# Patient Record
Sex: FEMALE | Race: WHITE | NOT HISPANIC OR LATINO | Employment: FULL TIME | URBAN - METROPOLITAN AREA
[De-identification: names, ages, dates, MRNs, and addresses within clinical notes are randomized per-mention and may not be internally consistent; named-entity substitution may affect disease eponyms.]

---

## 2017-10-24 ENCOUNTER — HOSPITAL ENCOUNTER (OUTPATIENT)
Facility: HOSPITAL | Age: 37
Setting detail: OBSERVATION
Discharge: HOME | End: 2017-10-25
Attending: EMERGENCY MEDICINE | Admitting: INTERNAL MEDICINE
Payer: COMMERCIAL

## 2017-10-24 ENCOUNTER — GENERIC CONVERSION - ENCOUNTER (OUTPATIENT)
Dept: OTHER | Facility: OTHER | Age: 37
End: 2017-10-24

## 2017-10-24 ENCOUNTER — APPOINTMENT (EMERGENCY)
Dept: CT IMAGING | Facility: HOSPITAL | Age: 37
End: 2017-10-24
Payer: COMMERCIAL

## 2017-10-24 ENCOUNTER — APPOINTMENT (EMERGENCY)
Dept: RADIOLOGY | Facility: HOSPITAL | Age: 37
End: 2017-10-24
Payer: COMMERCIAL

## 2017-10-24 DIAGNOSIS — R07.9 CHEST PAIN: ICD-10-CM

## 2017-10-24 DIAGNOSIS — I16.0 HYPERTENSIVE URGENCY: Primary | ICD-10-CM

## 2017-10-24 DIAGNOSIS — I10 HYPERTENSION: ICD-10-CM

## 2017-10-24 LAB
ALBUMIN SERPL BCP-MCNC: 3.5 G/DL (ref 3.5–5)
ALP SERPL-CCNC: 78 U/L (ref 46–116)
ALT SERPL W P-5'-P-CCNC: 25 U/L (ref 12–78)
ANION GAP SERPL CALCULATED.3IONS-SCNC: 10 MMOL/L (ref 4–13)
AST SERPL W P-5'-P-CCNC: 20 U/L (ref 5–45)
BASOPHILS # BLD AUTO: 0.03 THOUSANDS/ΜL (ref 0–0.1)
BASOPHILS NFR BLD AUTO: 0 % (ref 0–1)
BILIRUB SERPL-MCNC: 0.3 MG/DL (ref 0.2–1)
BUN SERPL-MCNC: 10 MG/DL (ref 5–25)
CALCIUM SERPL-MCNC: 8.5 MG/DL (ref 8.3–10.1)
CHLORIDE SERPL-SCNC: 101 MMOL/L (ref 100–108)
CO2 SERPL-SCNC: 30 MMOL/L (ref 21–32)
CREAT SERPL-MCNC: 0.98 MG/DL (ref 0.6–1.3)
EOSINOPHIL # BLD AUTO: 0.26 THOUSAND/ΜL (ref 0–0.61)
EOSINOPHIL NFR BLD AUTO: 3 % (ref 0–6)
ERYTHROCYTE [DISTWIDTH] IN BLOOD BY AUTOMATED COUNT: 13.1 % (ref 11.6–15.1)
EXT PREG TEST URINE: NEGATIVE
GFR SERPL CREATININE-BSD FRML MDRD: 73 ML/MIN/1.73SQ M
GLUCOSE SERPL-MCNC: 111 MG/DL (ref 65–140)
HCT VFR BLD AUTO: 43.1 % (ref 34.8–46.1)
HGB BLD-MCNC: 14.5 G/DL (ref 11.5–15.4)
LYMPHOCYTES # BLD AUTO: 2.25 THOUSANDS/ΜL (ref 0.6–4.47)
LYMPHOCYTES NFR BLD AUTO: 22 % (ref 14–44)
MCH RBC QN AUTO: 29.7 PG (ref 26.8–34.3)
MCHC RBC AUTO-ENTMCNC: 33.6 G/DL (ref 31.4–37.4)
MCV RBC AUTO: 88 FL (ref 82–98)
MONOCYTES # BLD AUTO: 0.76 THOUSAND/ΜL (ref 0.17–1.22)
MONOCYTES NFR BLD AUTO: 8 % (ref 4–12)
NEUTROPHILS # BLD AUTO: 6.74 THOUSANDS/ΜL (ref 1.85–7.62)
NEUTS SEG NFR BLD AUTO: 67 % (ref 43–75)
PLATELET # BLD AUTO: 289 THOUSANDS/UL (ref 149–390)
PMV BLD AUTO: 11.4 FL (ref 8.9–12.7)
POTASSIUM SERPL-SCNC: 2.7 MMOL/L (ref 3.5–5.3)
PROT SERPL-MCNC: 7.5 G/DL (ref 6.4–8.2)
RBC # BLD AUTO: 4.89 MILLION/UL (ref 3.81–5.12)
SODIUM SERPL-SCNC: 141 MMOL/L (ref 136–145)
TROPONIN I SERPL-MCNC: <0.02 NG/ML
WBC # BLD AUTO: 10.04 THOUSAND/UL (ref 4.31–10.16)

## 2017-10-24 PROCEDURE — 93005 ELECTROCARDIOGRAM TRACING: CPT | Performed by: EMERGENCY MEDICINE

## 2017-10-24 PROCEDURE — 80053 COMPREHEN METABOLIC PANEL: CPT | Performed by: EMERGENCY MEDICINE

## 2017-10-24 PROCEDURE — 96365 THER/PROPH/DIAG IV INF INIT: CPT

## 2017-10-24 PROCEDURE — 70450 CT HEAD/BRAIN W/O DYE: CPT

## 2017-10-24 PROCEDURE — 36415 COLL VENOUS BLD VENIPUNCTURE: CPT | Performed by: EMERGENCY MEDICINE

## 2017-10-24 PROCEDURE — 84484 ASSAY OF TROPONIN QUANT: CPT | Performed by: EMERGENCY MEDICINE

## 2017-10-24 PROCEDURE — 71020 HB CHEST X-RAY 2VW FRONTAL&LATL: CPT

## 2017-10-24 PROCEDURE — 81025 URINE PREGNANCY TEST: CPT | Performed by: EMERGENCY MEDICINE

## 2017-10-24 PROCEDURE — 85025 COMPLETE CBC W/AUTO DIFF WBC: CPT | Performed by: EMERGENCY MEDICINE

## 2017-10-24 PROCEDURE — 96375 TX/PRO/DX INJ NEW DRUG ADDON: CPT

## 2017-10-24 RX ORDER — LABETALOL HYDROCHLORIDE 5 MG/ML
10 INJECTION, SOLUTION INTRAVENOUS ONCE
Status: COMPLETED | OUTPATIENT
Start: 2017-10-24 | End: 2017-10-24

## 2017-10-24 RX ORDER — LEVOTHYROXINE SODIUM 112 UG/1
112 TABLET ORAL DAILY
COMMUNITY
End: 2018-10-17 | Stop reason: ALTCHOICE

## 2017-10-24 RX ORDER — ACETAMINOPHEN 325 MG/1
650 TABLET ORAL ONCE
Status: COMPLETED | OUTPATIENT
Start: 2017-10-24 | End: 2017-10-24

## 2017-10-24 RX ORDER — ESCITALOPRAM OXALATE 10 MG/1
20 TABLET ORAL DAILY
Status: ON HOLD | COMMUNITY
End: 2021-07-30 | Stop reason: SDUPTHER

## 2017-10-24 RX ORDER — AMLODIPINE BESYLATE AND BENAZEPRIL HYDROCHLORIDE 5; 20 MG/1; MG/1
1 CAPSULE ORAL DAILY
Status: ON HOLD | COMMUNITY
End: 2017-10-25

## 2017-10-24 RX ORDER — POTASSIUM CHLORIDE 14.9 MG/ML
20 INJECTION INTRAVENOUS ONCE
Status: COMPLETED | OUTPATIENT
Start: 2017-10-24 | End: 2017-10-25

## 2017-10-24 RX ORDER — POTASSIUM CHLORIDE 20 MEQ/1
20 TABLET, EXTENDED RELEASE ORAL ONCE
Status: COMPLETED | OUTPATIENT
Start: 2017-10-24 | End: 2017-10-24

## 2017-10-24 RX ORDER — ATORVASTATIN CALCIUM 20 MG/1
20 TABLET, FILM COATED ORAL DAILY
COMMUNITY
End: 2018-10-17 | Stop reason: ALTCHOICE

## 2017-10-24 RX ADMIN — ACETAMINOPHEN 650 MG: 325 TABLET ORAL at 23:00

## 2017-10-24 RX ADMIN — POTASSIUM CHLORIDE 20 MEQ: 1500 TABLET, EXTENDED RELEASE ORAL at 23:46

## 2017-10-24 RX ADMIN — POTASSIUM CHLORIDE 20 MEQ: 200 INJECTION, SOLUTION INTRAVENOUS at 23:47

## 2017-10-24 RX ADMIN — LABETALOL HYDROCHLORIDE 10 MG: 5 INJECTION, SOLUTION INTRAVENOUS at 23:35

## 2017-10-25 ENCOUNTER — GENERIC CONVERSION - ENCOUNTER (OUTPATIENT)
Dept: OTHER | Facility: OTHER | Age: 37
End: 2017-10-25

## 2017-10-25 ENCOUNTER — APPOINTMENT (OUTPATIENT)
Dept: NON INVASIVE DIAGNOSTICS | Facility: HOSPITAL | Age: 37
End: 2017-10-25
Payer: COMMERCIAL

## 2017-10-25 VITALS
TEMPERATURE: 98.8 F | WEIGHT: 139.11 LBS | OXYGEN SATURATION: 98 % | HEIGHT: 61 IN | HEART RATE: 84 BPM | SYSTOLIC BLOOD PRESSURE: 143 MMHG | RESPIRATION RATE: 16 BRPM | BODY MASS INDEX: 26.26 KG/M2 | DIASTOLIC BLOOD PRESSURE: 101 MMHG

## 2017-10-25 PROBLEM — E89.0 POSTOPERATIVE HYPOTHYROIDISM: Status: ACTIVE | Noted: 2017-10-25

## 2017-10-25 PROBLEM — Z72.0 TOBACCO ABUSE: Status: ACTIVE | Noted: 2017-10-25

## 2017-10-25 PROBLEM — E87.6 HYPOKALEMIA: Status: ACTIVE | Noted: 2017-10-25

## 2017-10-25 PROBLEM — R07.9 CHEST PAIN: Status: ACTIVE | Noted: 2017-10-25

## 2017-10-25 LAB
ANION GAP SERPL CALCULATED.3IONS-SCNC: 8 MMOL/L (ref 4–13)
ANION GAP SERPL CALCULATED.3IONS-SCNC: 9 MMOL/L (ref 4–13)
ATRIAL RATE: 88 BPM
BUN SERPL-MCNC: 11 MG/DL (ref 5–25)
BUN SERPL-MCNC: 12 MG/DL (ref 5–25)
CALCIUM SERPL-MCNC: 7.4 MG/DL (ref 8.3–10.1)
CALCIUM SERPL-MCNC: 7.9 MG/DL (ref 8.3–10.1)
CHLORIDE SERPL-SCNC: 104 MMOL/L (ref 100–108)
CHLORIDE SERPL-SCNC: 105 MMOL/L (ref 100–108)
CO2 SERPL-SCNC: 25 MMOL/L (ref 21–32)
CO2 SERPL-SCNC: 30 MMOL/L (ref 21–32)
CREAT SERPL-MCNC: 0.89 MG/DL (ref 0.6–1.3)
CREAT SERPL-MCNC: 0.98 MG/DL (ref 0.6–1.3)
ERYTHROCYTE [DISTWIDTH] IN BLOOD BY AUTOMATED COUNT: 13.2 % (ref 11.6–15.1)
GFR SERPL CREATININE-BSD FRML MDRD: 73 ML/MIN/1.73SQ M
GFR SERPL CREATININE-BSD FRML MDRD: 82 ML/MIN/1.73SQ M
GLUCOSE P FAST SERPL-MCNC: 100 MG/DL (ref 65–99)
GLUCOSE SERPL-MCNC: 100 MG/DL (ref 65–140)
GLUCOSE SERPL-MCNC: 134 MG/DL (ref 65–140)
HCT VFR BLD AUTO: 40.6 % (ref 34.8–46.1)
HGB BLD-MCNC: 13.5 G/DL (ref 11.5–15.4)
MAGNESIUM SERPL-MCNC: 1.7 MG/DL (ref 1.6–2.6)
MCH RBC QN AUTO: 29.7 PG (ref 26.8–34.3)
MCHC RBC AUTO-ENTMCNC: 33.3 G/DL (ref 31.4–37.4)
MCV RBC AUTO: 89 FL (ref 82–98)
P AXIS: 46 DEGREES
PLATELET # BLD AUTO: 278 THOUSANDS/UL (ref 149–390)
PMV BLD AUTO: 11.7 FL (ref 8.9–12.7)
POTASSIUM SERPL-SCNC: 2.7 MMOL/L (ref 3.5–5.3)
POTASSIUM SERPL-SCNC: 3.1 MMOL/L (ref 3.5–5.3)
PR INTERVAL: 160 MS
QRS AXIS: 17 DEGREES
QRSD INTERVAL: 86 MS
QT INTERVAL: 404 MS
QTC INTERVAL: 480 MS
RBC # BLD AUTO: 4.54 MILLION/UL (ref 3.81–5.12)
SODIUM SERPL-SCNC: 139 MMOL/L (ref 136–145)
SODIUM SERPL-SCNC: 142 MMOL/L (ref 136–145)
T WAVE AXIS: 24 DEGREES
TROPONIN I SERPL-MCNC: <0.02 NG/ML
TROPONIN I SERPL-MCNC: <0.02 NG/ML
VENTRICULAR RATE: 85 BPM
WBC # BLD AUTO: 7.93 THOUSAND/UL (ref 4.31–10.16)

## 2017-10-25 PROCEDURE — 83735 ASSAY OF MAGNESIUM: CPT | Performed by: INTERNAL MEDICINE

## 2017-10-25 PROCEDURE — 84484 ASSAY OF TROPONIN QUANT: CPT | Performed by: PHYSICIAN ASSISTANT

## 2017-10-25 PROCEDURE — 84244 ASSAY OF RENIN: CPT | Performed by: INTERNAL MEDICINE

## 2017-10-25 PROCEDURE — 93306 TTE W/DOPPLER COMPLETE: CPT

## 2017-10-25 PROCEDURE — 80048 BASIC METABOLIC PNL TOTAL CA: CPT | Performed by: PHYSICIAN ASSISTANT

## 2017-10-25 PROCEDURE — 85027 COMPLETE CBC AUTOMATED: CPT | Performed by: PHYSICIAN ASSISTANT

## 2017-10-25 PROCEDURE — 96376 TX/PRO/DX INJ SAME DRUG ADON: CPT

## 2017-10-25 PROCEDURE — 93005 ELECTROCARDIOGRAM TRACING: CPT | Performed by: INTERNAL MEDICINE

## 2017-10-25 PROCEDURE — 99285 EMERGENCY DEPT VISIT HI MDM: CPT

## 2017-10-25 PROCEDURE — 80048 BASIC METABOLIC PNL TOTAL CA: CPT | Performed by: INTERNAL MEDICINE

## 2017-10-25 PROCEDURE — 82088 ASSAY OF ALDOSTERONE: CPT | Performed by: INTERNAL MEDICINE

## 2017-10-25 RX ORDER — SODIUM CHLORIDE 9 MG/ML
100 INJECTION, SOLUTION INTRAVENOUS CONTINUOUS
Status: DISCONTINUED | OUTPATIENT
Start: 2017-10-25 | End: 2017-10-25 | Stop reason: HOSPADM

## 2017-10-25 RX ORDER — NITROGLYCERIN 0.4 MG/1
0.4 TABLET SUBLINGUAL
Status: DISCONTINUED | OUTPATIENT
Start: 2017-10-25 | End: 2017-10-25 | Stop reason: HOSPADM

## 2017-10-25 RX ORDER — METOPROLOL TARTRATE 5 MG/5ML
5 INJECTION INTRAVENOUS EVERY 6 HOURS PRN
Status: DISCONTINUED | OUTPATIENT
Start: 2017-10-25 | End: 2017-10-25 | Stop reason: HOSPADM

## 2017-10-25 RX ORDER — POTASSIUM CHLORIDE 14.9 MG/ML
20 INJECTION INTRAVENOUS
Status: COMPLETED | OUTPATIENT
Start: 2017-10-25 | End: 2017-10-25

## 2017-10-25 RX ORDER — LABETALOL HYDROCHLORIDE 5 MG/ML
10 INJECTION, SOLUTION INTRAVENOUS ONCE
Status: COMPLETED | OUTPATIENT
Start: 2017-10-25 | End: 2017-10-25

## 2017-10-25 RX ORDER — POTASSIUM CHLORIDE 14.9 MG/ML
20 INJECTION INTRAVENOUS ONCE
Status: COMPLETED | OUTPATIENT
Start: 2017-10-25 | End: 2017-10-25

## 2017-10-25 RX ORDER — POTASSIUM CHLORIDE 14.9 MG/ML
20 INJECTION INTRAVENOUS ONCE
Status: DISCONTINUED | OUTPATIENT
Start: 2017-10-25 | End: 2017-10-25 | Stop reason: HOSPADM

## 2017-10-25 RX ORDER — HYDRALAZINE HYDROCHLORIDE 20 MG/ML
10 INJECTION INTRAMUSCULAR; INTRAVENOUS EVERY 6 HOURS PRN
Status: DISCONTINUED | OUTPATIENT
Start: 2017-10-25 | End: 2017-10-25 | Stop reason: HOSPADM

## 2017-10-25 RX ORDER — POTASSIUM CHLORIDE 20 MEQ/1
40 TABLET, EXTENDED RELEASE ORAL ONCE
Status: COMPLETED | OUTPATIENT
Start: 2017-10-25 | End: 2017-10-25

## 2017-10-25 RX ORDER — ACETAMINOPHEN 325 MG/1
650 TABLET ORAL EVERY 6 HOURS PRN
Status: DISCONTINUED | OUTPATIENT
Start: 2017-10-25 | End: 2017-10-25 | Stop reason: HOSPADM

## 2017-10-25 RX ORDER — ONDANSETRON 2 MG/ML
4 INJECTION INTRAMUSCULAR; INTRAVENOUS EVERY 6 HOURS PRN
Status: DISCONTINUED | OUTPATIENT
Start: 2017-10-25 | End: 2017-10-25 | Stop reason: HOSPADM

## 2017-10-25 RX ORDER — AMLODIPINE BESYLATE AND BENAZEPRIL HYDROCHLORIDE 5; 20 MG/1; MG/1
1 CAPSULE ORAL DAILY
Qty: 30 CAPSULE | Refills: 0 | Status: SHIPPED | OUTPATIENT
Start: 2017-10-25

## 2017-10-25 RX ORDER — ATORVASTATIN CALCIUM 20 MG/1
20 TABLET, FILM COATED ORAL DAILY
Status: DISCONTINUED | OUTPATIENT
Start: 2017-10-25 | End: 2017-10-25 | Stop reason: HOSPADM

## 2017-10-25 RX ORDER — ESCITALOPRAM OXALATE 10 MG/1
5 TABLET ORAL DAILY
Status: DISCONTINUED | OUTPATIENT
Start: 2017-10-25 | End: 2017-10-25 | Stop reason: HOSPADM

## 2017-10-25 RX ORDER — ASPIRIN 81 MG/1
81 TABLET, CHEWABLE ORAL DAILY
Qty: 30 TABLET | Refills: 0 | Status: SHIPPED | OUTPATIENT
Start: 2017-10-26 | End: 2018-10-17 | Stop reason: ALTCHOICE

## 2017-10-25 RX ORDER — MORPHINE SULFATE 2 MG/ML
1 INJECTION, SOLUTION INTRAMUSCULAR; INTRAVENOUS EVERY 4 HOURS PRN
Status: DISCONTINUED | OUTPATIENT
Start: 2017-10-25 | End: 2017-10-25 | Stop reason: HOSPADM

## 2017-10-25 RX ORDER — ASPIRIN 325 MG
325 TABLET ORAL ONCE
Status: COMPLETED | OUTPATIENT
Start: 2017-10-25 | End: 2017-10-25

## 2017-10-25 RX ORDER — POTASSIUM CHLORIDE 20 MEQ/1
20 TABLET, EXTENDED RELEASE ORAL ONCE
Status: COMPLETED | OUTPATIENT
Start: 2017-10-25 | End: 2017-10-25

## 2017-10-25 RX ORDER — LEVOTHYROXINE SODIUM 112 UG/1
112 TABLET ORAL
Status: DISCONTINUED | OUTPATIENT
Start: 2017-10-25 | End: 2017-10-25 | Stop reason: HOSPADM

## 2017-10-25 RX ORDER — ASPIRIN 81 MG/1
81 TABLET, CHEWABLE ORAL DAILY
Status: DISCONTINUED | OUTPATIENT
Start: 2017-10-26 | End: 2017-10-25 | Stop reason: HOSPADM

## 2017-10-25 RX ADMIN — ACETAMINOPHEN 650 MG: 325 TABLET ORAL at 06:43

## 2017-10-25 RX ADMIN — ASPIRIN 325 MG: 325 TABLET ORAL at 01:13

## 2017-10-25 RX ADMIN — POTASSIUM CHLORIDE 40 MEQ: 1500 TABLET, EXTENDED RELEASE ORAL at 18:44

## 2017-10-25 RX ADMIN — SODIUM CHLORIDE 100 ML/HR: 0.9 INJECTION, SOLUTION INTRAVENOUS at 02:40

## 2017-10-25 RX ADMIN — Medication 400 MG: at 14:17

## 2017-10-25 RX ADMIN — ATORVASTATIN CALCIUM 20 MG: 20 TABLET, FILM COATED ORAL at 08:17

## 2017-10-25 RX ADMIN — POTASSIUM CHLORIDE 20 MEQ: 200 INJECTION, SOLUTION INTRAVENOUS at 14:18

## 2017-10-25 RX ADMIN — LABETALOL HYDROCHLORIDE 10 MG: 5 INJECTION, SOLUTION INTRAVENOUS at 00:38

## 2017-10-25 RX ADMIN — ESCITALOPRAM OXALATE 5 MG: 10 TABLET ORAL at 08:18

## 2017-10-25 RX ADMIN — LEVOTHYROXINE SODIUM 112 MCG: 112 TABLET ORAL at 05:13

## 2017-10-25 RX ADMIN — ACETAMINOPHEN 650 MG: 325 TABLET ORAL at 17:06

## 2017-10-25 RX ADMIN — POTASSIUM CHLORIDE 40 MEQ: 1500 TABLET, EXTENDED RELEASE ORAL at 09:43

## 2017-10-25 RX ADMIN — POTASSIUM CHLORIDE 20 MEQ: 200 INJECTION, SOLUTION INTRAVENOUS at 06:34

## 2017-10-25 RX ADMIN — POTASSIUM CHLORIDE 20 MEQ: 1500 TABLET, EXTENDED RELEASE ORAL at 06:34

## 2017-10-25 RX ADMIN — POTASSIUM CHLORIDE 20 MEQ: 200 INJECTION, SOLUTION INTRAVENOUS at 09:23

## 2017-10-25 RX ADMIN — BENAZEPRIL HYDROCHLORIDE: 10 TABLET, FILM COATED ORAL at 08:17

## 2017-10-25 NOTE — ED PROVIDER NOTES
History  Chief Complaint   Patient presents with    Neck Pain     pt c/o extreme neck, back, shoulder pain starting saturday and just "haven't felt right since" seen at [facility], sent for High BP and chest pain started today  Pt in ER with c/o headache/neck pain/HTN  She has been non compliant with her anti-HTN for at least 2mths, due to insurance issues  Pt checked her BP earlier today at the grocery store, confirmed it at an urgent care center and was told to come to the Er  She also c/o chest pain that has been ongoing all day - pain doesn't radiate and is not reproducible  Prior to Admission Medications   Prescriptions Last Dose Informant Patient Reported? Taking? amLODIPine-benazepril (LOTREL 5-20) 5-20 MG per capsule More than a month at Unknown time  Yes No   Sig: Take 1 capsule by mouth daily Prescribed, however, currently not taking due to insurance issue   atorvastatin (LIPITOR) 20 mg tablet More than a month at Unknown time  Yes No   Sig: Take 20 mg by mouth daily Prescribed, however, not currently taking due to insurance issue   escitalopram (LEXAPRO) 10 mg tablet   Yes Yes   Sig: Take 5 mg by mouth daily   levothyroxine (SYNTHROID) 112 mcg tablet   Yes Yes   Sig: Take 112 mcg by mouth daily      Facility-Administered Medications: None       Past Medical History:   Diagnosis Date    Cancer ([address] )     thyroid removed    Hyperlipidemia     Hypertension     Hypokalemia 10/25/2017    Postoperative hypothyroidism 10/25/2017    Tobacco abuse 10/25/2017       Past Surgical History:   Procedure Laterality Date    THYROID SURGERY      removed due to cancer       History reviewed  No pertinent family history  I have reviewed and agree with the history as documented      Social History   Substance Use Topics    Smoking status: Current Every Day Smoker     Packs/day: 0 25     Years: 15 00     Types: Cigarettes    Smokeless tobacco: Never Used    Alcohol use No        Review of Systems   Constitutional: Negative for chills and fever  Respiratory: Negative for cough, chest tightness and shortness of breath  Cardiovascular: Positive for chest pain  Gastrointestinal: Negative for abdominal pain, diarrhea, nausea and vomiting  Genitourinary: Negative for dysuria, frequency, hematuria and urgency  Musculoskeletal: Negative for back pain, neck pain and neck stiffness  Neurological: Positive for headaches  Negative for dizziness, seizures, syncope, weakness, light-headedness and numbness  All other systems reviewed and are negative  Physical Exam  ED Triage Vitals [10/24/17 2132]   Temperature Pulse Respirations Blood Pressure SpO2   98 9 °F (37 2 °C) 89 18 (!) 224/136 97 %      Temp Source Heart Rate Source Patient Position - Orthostatic VS BP Location FiO2 (%)   Oral Monitor Sitting Left arm --      Pain Score       6           Physical Exam   Constitutional: She appears well-developed and well-nourished  No distress  HENT:   Head: Normocephalic and atraumatic  Eyes: Conjunctivae are normal  Pupils are equal, round, and reactive to light  Neck: Normal range of motion  Neck supple  Cardiovascular: Normal rate, regular rhythm and normal heart sounds  No murmur heard  Pulmonary/Chest: Effort normal and breath sounds normal  No respiratory distress  Abdominal: Soft  Bowel sounds are normal  She exhibits no distension  There is no tenderness  Musculoskeletal: Normal range of motion  She exhibits no edema or deformity  Neurological: She is alert  No cranial nerve deficit  Skin: Skin is warm and dry  No rash noted  She is not diaphoretic  No pallor  Psychiatric: She has a normal mood and affect  Her behavior is normal    Nursing note and vitals reviewed        ED Medications  Medications   acetaminophen (TYLENOL) tablet 650 mg (650 mg Oral Given 10/24/17 2300)   labetalol (NORMODYNE) injection 10 mg (10 mg Intravenous Given 10/24/17 2335)   potassium chloride 20 mEq IVPB (premix) (20 mEq Intravenous New Bag 10/24/17 2347)   potassium chloride (K-DUR,KLOR-CON) CR tablet 20 mEq (20 mEq Oral Given 10/24/17 2346)   labetalol (NORMODYNE) injection 10 mg (10 mg Intravenous Given 10/25/17 0038)   aspirin tablet 325 mg (325 mg Oral Given 10/25/17 0113)   potassium chloride (K-DUR,KLOR-CON) CR tablet 20 mEq (20 mEq Oral Given 10/25/17 0634)   potassium chloride 20 mEq IVPB (premix) (0 mEq Intravenous Stopped 10/25/17 1123)   potassium chloride 20 mEq IVPB (premix) (20 mEq Intravenous New Bag 10/25/17 1418)   potassium chloride (K-DUR,KLOR-CON) CR tablet 40 mEq (40 mEq Oral Given 10/25/17 0943)   potassium chloride (K-DUR,KLOR-CON) CR tablet 40 mEq (40 mEq Oral Given 10/25/17 1844)       Diagnostic Studies  Labs Reviewed   COMPREHENSIVE METABOLIC PANEL - Abnormal        Result Value Ref Range Status    Potassium 2 7 (*) 3 5 - 5 3 mmol/L Final    Sodium 141  136 - 145 mmol/L Final    Chloride 101  100 - 108 mmol/L Final    CO2 30  21 - 32 mmol/L Final    Anion Gap 10  4 - 13 mmol/L Final    BUN 10  5 - 25 mg/dL Final    Creatinine 0 98  0 60 - 1 30 mg/dL Final    Comment: Standardized to IDMS reference method    Glucose 111  65 - 140 mg/dL Final    Comment:   If the patient is fasting, the ADA then defines impaired fasting glucose as > 100 mg/dL and diabetes as > or equal to 123 mg/dL  Specimen collection should occur prior to Sulfasalazine administration due to the potential for falsely depressed results  Specimen collection should occur prior to Sulfapyridine administration due to the potential for falsely elevated results  Calcium 8 5  8 3 - 10 1 mg/dL Final    AST 20  5 - 45 U/L Final    Comment:   Specimen collection should occur prior to Sulfasalazine administration due to the potential for falsely depressed results       ALT 25  12 - 78 U/L Final    Comment:   Specimen collection should occur prior to Sulfasalazine administration due to the potential for falsely depressed results  Alkaline Phosphatase 78  46 - 116 U/L Final    Total Protein 7 5  6 4 - 8 2 g/dL Final    Albumin 3 5  3 5 - 5 0 g/dL Final    Total Bilirubin 0 30  0 20 - 1 00 mg/dL Final    eGFR 73  ml/min/1 73sq m Final    Narrative:     National Kidney Disease Education Program recommendations are as follows:  GFR calculation is accurate only with a steady state creatinine  Chronic Kidney disease less than 60 ml/min/1 73 sq  meters  Kidney failure less than 15 ml/min/1 73 sq  meters  CBC AND DIFFERENTIAL - Normal    WBC 10 04  4 31 - 10 16 Thousand/uL Final    RBC 4 89  3 81 - 5 12 Million/uL Final    Hemoglobin 14 5  11 5 - 15 4 g/dL Final    Hematocrit 43 1  34 8 - 46 1 % Final    MCV 88  82 - 98 fL Final    MCH 29 7  26 8 - 34 3 pg Final    MCHC 33 6  31 4 - 37 4 g/dL Final    RDW 13 1  11 6 - 15 1 % Final    MPV 11 4  8 9 - 12 7 fL Final    Platelets 475  805 - 390 Thousands/uL Final    Neutrophils Relative 67  43 - 75 % Final    Lymphocytes Relative 22  14 - 44 % Final    Monocytes Relative 8  4 - 12 % Final    Eosinophils Relative 3  0 - 6 % Final    Basophils Relative 0  0 - 1 % Final    Neutrophils Absolute 6 74  1 85 - 7 62 Thousands/µL Final    Lymphocytes Absolute 2 25  0 60 - 4 47 Thousands/µL Final    Monocytes Absolute 0 76  0 17 - 1 22 Thousand/µL Final    Eosinophils Absolute 0 26  0 00 - 0 61 Thousand/µL Final    Basophils Absolute 0 03  0 00 - 0 10 Thousands/µL Final   TROPONIN I - Normal    Troponin I <0 02  <=0 04 ng/mL Final    Narrative:     [employer] Chemistry analyzer 99% cutoff is > 0 04 ng/mL in network labs    o cTnI 99% cutoff is useful only when applied to patients in the clinical setting of myocardial ischemia  o cTnI 99% cutoff should be interpreted in the context of clinical history, ECG findings and possibly cardiac imaging to establish correct diagnosis    o cTnI 99% cutoff may be suggestive but clearly not indicative of a coronary event without the clinical setting of myocardial ischemia  POCT PREGNANCY, URINE - Normal    EXT PREG TEST UR (Ref: Negative) negative   Final       XR chest 2 views   ED Interpretation   nad      Final Result      No active pulmonary disease  Workstation performed: [id number]         CT head without contrast   Final Result      No acute intracranial abnormality  Workstation performed: [id number]             Procedures  ECG 12 Lead Documentation  Date/Time: 10/25/2017 1:38 AM  Performed by: Ryan Blue by: Del Higgins     Indications / Diagnosis:  Chest pain  ECG reviewed by me, the ED Provider: yes    Patient location:  ED  Previous ECG:     Previous ECG:  Unavailable    Comparison to cardiac monitor: Yes    Interpretation:     Interpretation: normal    Rate:     ECG rate:  84bpm    ECG rate assessment: normal    Rhythm:     Rhythm: sinus rhythm    CriticalCare Time  Performed by: Del Higgins  Authorized by: Del Higgins     Critical care provider statement:     Critical care time (minutes):  35    Critical care time was exclusive of:  Separately billable procedures and treating other patients    Critical care was necessary to treat or prevent imminent or life-threatening deterioration of the following conditions: accelerated hypertension      Critical care was time spent personally by me on the following activities:  Obtaining history from patient or surrogate, development of treatment plan with patient or surrogate, evaluation of patient's response to treatment, examination of patient, ordering and performing treatments and interventions, ordering and review of laboratory studies, ordering and review of radiographic studies and re-evaluation of patient's condition    I assumed direction of critical care for this patient from another provider in my specialty: no            Phone Contacts  ED Phone Contact    ED Course  ED Course                                MDM  Number of Diagnoses or Management Options  Chest pain:   Hypertensive urgency:   Diagnosis management comments: 38y/o female with a hx of HTN but non compliant, in ER with accelerated hypertension, unresolved with labetalol  Will obs to hospitalist    The patient presented with a condition in which there was a high probability of imminent or life-threatening deterioration, and critical care services (excluding separately billable procedures) totalled 30-74 minutes  Disposition  Final diagnoses:   Hypertensive urgency   Chest pain     Time reflects when diagnosis was documented in both MDM as applicable and the Disposition within this note     Time User Action Codes Description Comment    10/25/2017  1:05 AM Janit Jules O Add [I16 0] Hypertensive urgency     10/25/2017  1:05 AM Janit Jules O Add [R07 9] Chest pain     10/25/2017  2:34 PM Norma Izquierdo Jaylen Bateliers Hypertension       ED Disposition     ED Disposition Condition Comment    Admit  Case was discussed with Perri and the patient's admission status was agreed to be Admission Status: observation status to the service of Dr Brennan Snowden   Follow-up Information     Follow up With Specialties Details Why Francisco Javier Cisneros MD Cardiology Follow up on 12/7/2017 @420pm Estephania Linares, DO  Call in 1 day(s) Call and follow up with your PCP in 2-3 days   Check your potassium in 2 days time and follow up with PCP   Francoise Carr [address] [place] [id number]  [phone]          Discharge Medication List as of 10/25/2017  6:43 PM      START taking these medications    Details   aspirin 81 mg chewable tablet Chew 1 tablet daily, Starting Thu 10/26/2017, Print         CONTINUE these medications which have CHANGED    Details   amLODIPine-benazepril (LOTREL 5-20) 5-20 MG per capsule Take 1 capsule by mouth daily Prescribed, however, currently not taking due to insurance issue, Starting Wed 10/25/2017, Print         CONTINUE these medications which have NOT CHANGED    Details   escitalopram (LEXAPRO) 10 mg tablet Take 5 mg by mouth daily, Historical Med      levothyroxine (SYNTHROID) 112 mcg tablet Take 112 mcg by mouth daily, Historical Med      atorvastatin (LIPITOR) 20 mg tablet Take 20 mg by mouth daily Prescribed, however, not currently taking due to insurance issue, Historical Med             Outpatient Discharge Orders  Basic metabolic panel   Standing Status: Future  Standing Exp   Date: 10/25/18     Activity as tolerated     Call provider for:  persistent nausea or vomiting     Call provider for:  severe uncontrolled pain         ED Provider  Electronically Signed by       Franchester Boas, DO  10/26/17 [id number]

## 2017-10-25 NOTE — DISCHARGE SUMMARY
Discharge Summary - [facility]    Patient Information: Clau Nagy 44 y o  female MRN: [id number]  Unit/Bed#: -01 Encounter: [id number]    Discharging Physician / Practitioner: Madai Avendaño MD  PCP: Carmen Martin DO  Admission Date: 10/24/2017  Discharge Date: 10/25/17    Reason for Admission:   Chest pain  Discharge Diagnoses:     Principal Problem:    Chest pain  Active Problems:    Hyperlipidemia    Hypertension    Postoperative hypothyroidism    Hypokalemia    Tobacco abuse  Resolved Problems:    * No resolved hospital problems  *  Chest pain with risk factors  troponins are negative  Cardiology saw patient and she will follow up outpatient with Dr Mackie Aase for work up of high blood pressure as well as possibly have stress test      Her pain has resolved at the time of discharge       Hypokalemia  K is 2 7 on second measurement and came up to 3 1 after repletion with IV  Patient was given 40 meq PO in addition to this and sent home with script for BMP to be checked and followed up with PCP or cardiology  This was likely 2/2 vomiting yesterday  Vomiting has subsided       Hypertensive urgency  This has resolved  Patient originally had BP of 200 when admitted  Now down to 143/101  Will need outpatient work up  Consultations During Hospital Stay:  · Cardiology - Dr Mackie Aase  Procedures Performed:     · CT brain -   No acute intracranial abnormality  CXR   -No active pulmonary disease  Significant Findings / Test Results:     · None  Incidental Findings:   · As above  Test Results Pending at Discharge (will require follow up): · None  Outpatient Tests Requested:  · BMP to be followed up by PCP  · Will also need work up of HTN and stress EKG  Complications:    None  Hospital Course:     Clau Nagy is a 44 y o  female patient who originally presented to the hospital on 10/24/2017 due to chest pain       Her EKG's were negative for any ischemic changes and her troponins were negative x3  She did have a significant smoking history as well as a father with an MI at 36years old, so cardiology were consulted to help with follow up and further diagnostic testing  The plan is for her to have an outpatient stress test and follow up with Dr Barby Hurt  Patient had a 3 days history of dull substernal CP, which was associated with nausea and vomiting  She had a potassium of 2 7 on admission and the following morning after repletion it was still low at 2 7  After further IV repletion it was 3 1 and patient was given 40 meq PO and discharged home with script for a repeat BMP to be done in 2 days time and followed up with PCP or Dr Barby Hurt  An Echo was also performed which showed grade 1 diastolic dysfunction but not regional wall abnormalities  Condition at Discharge: stable     Discharge Day Visit / Exam:     Subjective:      Patient seen and examined today  Chest pain was still present but improved  Patient feels "okay"  No new complaints  Eating and drinking well  No fevers or chills  Vitals: Blood Pressure: (!) 143/101 (10/25/17 1520)  Pulse: 84 (10/25/17 1520)  Temperature: 98 8 °F (37 1 °C) (10/25/17 1520)  Temp Source: Oral (10/25/17 1520)  Respirations: 16 (10/25/17 1520)  Height: 5' 1" (154 9 cm) (10/25/17 0147)  Weight - Scale: 63 1 kg (139 lb 1 8 oz) (10/25/17 0147)  SpO2: 98 % (10/25/17 1520)  Exam:   Physical Exam   Constitutional: She is oriented to person, place, and time  She appears well-developed and well-nourished  HENT:   Head: Normocephalic and atraumatic  Mouth/Throat: No oropharyngeal exudate  Eyes: Right eye exhibits no discharge  Left eye exhibits no discharge  No scleral icterus  Neck: No JVD present  No tracheal deviation present  No thyromegaly present  Cardiovascular: Normal rate  Exam reveals no gallop and no friction rub  No murmur heard    Pulmonary/Chest: No respiratory distress  She has no wheezes  She has no rales  She exhibits no tenderness  Abdominal: She exhibits no distension and no mass  There is no tenderness  There is no rebound and no guarding  Musculoskeletal: She exhibits no edema, tenderness or deformity  Lymphadenopathy:     She has no cervical adenopathy  Neurological: She is alert and oriented to person, place, and time  She displays normal reflexes  No cranial nerve deficit  She exhibits normal muscle tone  Coordination normal    Skin: No rash noted  No erythema  No pallor  Psychiatric: She has a normal mood and affect  Discussion with Family: Discussed with patient  Not with family  Discharge instructions/Information to patient and family:   See after visit summary for information provided to patient and family  Provisions for Follow-Up Care:  See after visit summary for information related to follow-up care and any pertinent home health orders  Disposition:     Home    For Discharges to [facility] SNF:   · Not Applicable to this Patient - Not Applicable to this Patient    Planned Readmission: None  Discharge Statement:  I spent 45 minutes discharging the patient  This time was spent on the day of discharge  I had direct contact with the patient on the day of discharge  Greater than 50% of the total time was spent examining patient, answering all patient questions, arranging and discussing plan of care with patient as well as directly providing post-discharge instructions  Additional time then spent on discharge activities  Discharge Medications:  See after visit summary for reconciled discharge medications provided to patient and family        ** Please Note: This note has been constructed using a voice recognition system **

## 2017-10-25 NOTE — PLAN OF CARE
CARDIOVASCULAR - ADULT     Maintains optimal cardiac output and hemodynamic stability Progressing     Absence of cardiac dysrhythmias or at baseline rhythm Progressing        METABOLIC, FLUID AND ELECTROLYTES - ADULT     Electrolytes maintained within normal limits Progressing

## 2017-10-25 NOTE — H&P
History and Physical - [facility] Internal Medicine    Patient Information: Brent Ashby 44 y o  female MRN: [id number]  Unit/Bed#: ED 08 Encounter: [id number]  Admitting Physician: Marisel Knox PA-C  PCP: Ritika Bravo DO  Date of Admission:  10/25/17    Assessment/Plan:    Hospital Problem List:     Principal Problem:    Chest pain  Active Problems:    Hyperlipidemia    Hypertension    Postoperative hypothyroidism    Hypokalemia    Tobacco abuse      Plan for the Primary Problem(s):  · Chest pain  · CXR- No acute intracranial abnormality  · EKG- NSR  · Troponin <0 02  Will trend  · Nitro, Morphine, ASA  · EKG for chest pain  · Telemetry  Plan for Additional Problems:   · HTN with urgency- Dizziness, HA,chest pain, palpitations on arrival- likely secondary to prolonged HTN  Non-compliant with BP meds for last 2 months secondary to insurance lack of insurance coverage- now has insurance  /136 on arrival  Improved to 167/114 with labetalol 10mg x2  CT Head- No acute intracranial abnormality  Restart home medications  CM consult for insurance coverage  Add hydralazine, lopressor PRN  · Hypokalemia- K 2 7  PO, IV K given in ED  Monitor and replete as necessary  · HLD- Continue statin  · Hypothyroidism- Thyroid cancer s/p thyroidectomy  Continue synthroid  · Tobacco abuse- Refused Nicotine patch  Encourage cessation  VTE Prophylaxis: Low Risk  / sequential compression device   Code Status: Full Code  POLST: POLST form is not discussed and not completed at this time  Anticipated Length of Stay:  Patient will be admitted on an Observation basis with an anticipated length of stay of  Less than 2 midnights  Justification for Hospital Stay: CP, HTN urgency, hypokalemia  Total Time for Visit, including Counseling / Coordination of Care: 45 minutes  Greater than 50% of this total time spent on direct patient counseling and coordination of care      Chief Complaint:   Chest pain     History of Present Illness:    Dea Garza is a 44 y o  female who presents with headache, lightheadedness, chest pain, palpitations x4days  Patient states that she has not been taking her blood pressure medications for the past 2 months because they had a lapse in their insurance coverage and she could not see her PCP for a refill  She states that over the past 2 months, she has not checked her blood pressure  She has noticed that she has been having more frequent headaches over the past 2 months  She developed one on Saturday that was a 10/10, throbbing all over her head with associated nausea and vomiting  Patient states that Saturday night in to Sunday she was unable to keep down even sips of water without vomiting  Additionally she has had a 4/10 dull ache in the center of her chest that has been on and off over the past 4 days  She denies radiation of pain  She reports associated palpitations  She has a FHx significant for MI in father at age 37  Review of Systems:    Review of Systems   Constitutional: Negative for appetite change, chills, diaphoresis and fever  Respiratory: Negative for cough, shortness of breath and wheezing  Cardiovascular: Positive for chest pain and palpitations  Gastrointestinal: Positive for nausea and vomiting  Negative for abdominal pain, constipation and diarrhea  Genitourinary: Negative for dysuria and hematuria  Neurological: Positive for light-headedness and headaches  All other systems reviewed and are negative        Past Medical and Surgical History:     Past Medical History:   Diagnosis Date    Cancer [facility])     thyroid removed    Hyperlipidemia     Hypertension     Hypokalemia 10/25/2017    Postoperative hypothyroidism 10/25/2017    Tobacco abuse 10/25/2017       Past Surgical History:   Procedure Laterality Date    THYROID SURGERY      removed due to cancer       Meds/Allergies:    Prior to Admission medications    Medication Sig Start Date End Date Taking? Authorizing Provider   escitalopram (LEXAPRO) 10 mg tablet Take 5 mg by mouth daily   Yes Historical Provider, MD   levothyroxine (SYNTHROID) 112 mcg tablet Take 112 mcg by mouth daily   Yes Historical Provider, MD   amLODIPine-benazepril (LOTREL 5-20) 5-20 MG per capsule Take 1 capsule by mouth daily Prescribed, however, currently not taking due to insurance issue    Historical Provider, MD   atorvastatin (LIPITOR) 20 mg tablet Take 20 mg by mouth daily Prescribed, however, not currently taking due to insurance issue    Historical Provider, MD     I have reviewed home medications with patient personally  Allergies: No Known Allergies    Social History:     Marital Status: /Civil Union   Occupation: Unknown  Patient Pre-hospital Living Situation: Home  Patient Pre-hospital Level of Mobility: Independent  Patient Pre-hospital Diet Restrictions: None  Substance Use History:   History   Alcohol Use No     History   Smoking Status    Current Every Day Smoker    Packs/day: 0 25    Types: Cigarettes   Smokeless Tobacco    Never Used     History   Drug Use No       Family History:    non-contributory    Physical Exam:     Vitals:   Blood Pressure: (!) 167/114 (10/25/17 0030)  Pulse: 70 (10/25/17 0030)  Temperature: 98 9 °F (37 2 °C) (10/24/17 2132)  Temp Source: Oral (10/24/17 2132)  Respirations: 20 (10/25/17 0030)  Weight - Scale: 63 5 kg (140 lb) (10/24/17 2132)  SpO2: 96 % (10/25/17 0030)    Physical Exam   Constitutional: She is oriented to person, place, and time  She appears well-developed and well-nourished  She is cooperative  She does not appear ill  No distress  HENT:   Head: Normocephalic and atraumatic  Eyes: Conjunctivae, EOM and lids are normal  Pupils are equal, round, and reactive to light  Cardiovascular: Normal rate, regular rhythm, normal heart sounds and normal pulses  No murmur heard    Pulmonary/Chest: Effort normal and breath sounds normal  She has no wheezes  She has no rhonchi  She has no rales  Abdominal: Soft  Normal appearance and bowel sounds are normal  There is no tenderness  Musculoskeletal: Normal range of motion  Neurological: She is alert and oriented to person, place, and time  She has normal strength  She is not disoriented  No sensory deficit  Skin: Skin is warm, dry and intact  She is not diaphoretic  Psychiatric: She has a normal mood and affect  Her speech is normal and behavior is normal  Cognition and memory are normal    Vitals reviewed  Additional Data:     Lab Results: I have personally reviewed pertinent reports  Results from last 7 days  Lab Units 10/24/17  2259   WBC Thousand/uL 10 04   HEMOGLOBIN g/dL 14 5   HEMATOCRIT % 43 1   PLATELETS Thousands/uL 289   NEUTROS PCT % 67   LYMPHS PCT % 22   MONOS PCT % 8   EOS PCT % 3       Results from last 7 days  Lab Units 10/24/17  2259   SODIUM mmol/L 141   POTASSIUM mmol/L 2 7*   CHLORIDE mmol/L 101   CO2 mmol/L 30   BUN mg/dL 10   CREATININE mg/dL 0 98   CALCIUM mg/dL 8 5   TOTAL PROTEIN g/dL 7 5   BILIRUBIN TOTAL mg/dL 0 30   ALK PHOS U/L 78   ALT U/L 25   AST U/L 20   GLUCOSE RANDOM mg/dL 111           Imaging: I have personally reviewed pertinent reports  Ct Head Without Contrast    Result Date: 10/24/2017  Narrative: CT BRAIN - WITHOUT CONTRAST INDICATION:  Headache and dizziness  COMPARISON:  None  TECHNIQUE:  CT examination of the brain was performed  In addition to axial images, coronal reformatted images were created and submitted for interpretation  Radiation dose length product (DLP) for this visit:  1271 mGy-cm   This examination, like all CT scans performed in the [facility], was performed utilizing techniques to minimize radiation dose exposure, including the use of iterative reconstruction and automated exposure control  IMAGE QUALITY:  Diagnostic  FINDINGS:  PARENCHYMA:  No intracranial mass, mass effect or midline shift   No CT signs of acute infarction  There is no parenchymal hemorrhage  VENTRICLES AND EXTRA-AXIAL SPACES:  Normal for patient's age  VISUALIZED ORBITS AND PARANASAL SINUSES:  Unremarkable  CALVARIUM AND EXTRACRANIAL SOFT TISSUES:   Normal      Impression: No acute intracranial abnormality  Workstation performed: [id number]       EKG, Pathology, and Other Studies Reviewed on Admission:   · EKG: NSR  Allscripts Records Reviewed: No     ** Please Note: Dragon 360 Dictation voice to text software may have been used in the creation of this document   **

## 2017-10-25 NOTE — CONSULTS
Consultation - Cardiology Team One  Kamari Enriquez 44 y o  female MRN: [id number]  Unit/Bed#: -01 Encounter: [id number]    Inpatient consult to Cardiology  Consult performed by: Ayala Kendall ordered by: Diamond Mccann             Chief Complaint   Patient presents with    Neck Pain       pt c/o extreme neck, back, shoulder pain starting saturday and just "haven't felt right since" seen at [facility], sent for High BP and chest pain started today  Physician Requesting Consult: Yordy Jhaveri MD  Reason for Consult / Principal Problem:  Chest pain    History of Present Illness   HPI: Kamari Enriquez is a 44y o  year old female who has a history of hypertension, Hyperlipidemia, ongoing tobacco abuse and papillary thyroid cancer  She has been noncompliant with her antihypertensive therapy for the last 2 months, as she and her  had a gap in insurance coverage  She presents with persistent chest pain and an elevated blood pressure  She had no radicular CP  No neck ,jaw or arm pain  She had intractable vomiting ( 20 x she tells me),and pain in the back of her head  In the emergency room her initial blood pressure was 224/136  Serial troponins have been unremarkable  Her EKG showed sinus rhythm with diffuse nonspecific ST T wave changes, similar to prior  Her potassium was 2 7 and remains 2 7 today  Magnesium was 1 7  Urine pregnancy test:  Negative  CT of the head- no acute findings  In the emergency room she was given labetalol 10 mg IV x2 doses, aspirin and supplemental IV potassium  Her last blood pressure was 160/100  She was restarted on her OP regimen, Lotrel 5/20 daily  She is now feeling       Family history:  Her father had myocardial infarction in his 45s, and is now   Her sister has HTN  Her ma has htn  Social history:+ tobacco abuse  PMH- 3 childbirths   After her last which was 4 years ago, she had pre-eclampsia and her BP persistently remained high after that  Review of Systems   Constitution: Negative for chills, fever, weakness and malaise/fatigue  Cardiovascular:        See HPI  Currently, she denies chest pain/pressure  Respiratory: Positive for sleep disturbances due to breathing and snoring  Negative for cough and shortness of breath  Gastrointestinal: Negative for anorexia, nausea and vomiting  Neurological: Positive for headaches  Negative for dizziness, focal weakness and light-headedness  All other systems reviewed and are negative  Historical Information   Past Medical History:   Diagnosis Date    Cancer ([address] )     thyroid removed    Hyperlipidemia     Hypertension     Hypokalemia 10/25/2017    Postoperative hypothyroidism 10/25/2017    Tobacco abuse 10/25/2017     Past Surgical History:   Procedure Laterality Date    THYROID SURGERY      removed due to cancer     History   Alcohol Use No     History   Drug Use No     History   Smoking Status    Current Every Day Smoker    Packs/day: 0 25    Years: 15 00    Types: Cigarettes   Smokeless Tobacco    Never Used     Family History: History reviewed  No pertinent family history      Meds/Allergies   all current active meds have been reviewed and current meds:   Current Facility-Administered Medications   Medication Dose Route Frequency    acetaminophen (TYLENOL) tablet 650 mg  650 mg Oral Q6H PRN    amLODIPine-benazepril (LOTREL 5/20) combo dose   Oral Daily    [START ON 10/26/2017] aspirin chewable tablet 81 mg  81 mg Oral Daily    atorvastatin (LIPITOR) tablet 20 mg  20 mg Oral Daily    escitalopram (LEXAPRO) tablet 5 mg  5 mg Oral Daily    hydrALAZINE (APRESOLINE) injection 10 mg  10 mg Intravenous Q6H PRN    influenza inactivated quadrivalent vaccine (FLULAVAL) IM injection 0 5 mL  0 5 mL Intramuscular Prior to discharge    levothyroxine tablet 112 mcg  112 mcg Oral Early Morning    metoprolol (LOPRESSOR) injection 5 mg  5 mg Intravenous Q6H PRN    morphine injection 1 mg  1 mg Intravenous Q4H PRN    nitroglycerin (NITROSTAT) SL tablet 0 4 mg  0 4 mg Sublingual Q5 Min PRN    ondansetron (ZOFRAN) injection 4 mg  4 mg Intravenous Q6H PRN    potassium chloride 20 mEq IVPB (premix)  20 mEq Intravenous Once    potassium chloride 20 mEq IVPB (premix)  20 mEq Intravenous Once    sodium chloride 0 9 % infusion  100 mL/hr Intravenous Continuous       sodium chloride 100 mL/hr Last Rate: 100 mL/hr (10/25/17 0240)       No Known Allergies    Objective   Vitals: Blood pressure 160/100, pulse 74, temperature 98 7 °F (37 1 °C), temperature source Oral, resp  rate 16, height 5' 1" (1 549 m), weight 63 1 kg (139 lb 1 8 oz), SpO2 94 %  ,     Body mass index is 26 28 kg/m²  ,     Systolic (04EOT), ZIT:152 , Min:155 , PFZ:914     Diastolic (12LMR), YDH:008, Min:100, Max:136      No intake or output data in the 24 hours ending 10/25/17 1218  Weight (last 2 days)     Date/Time   Weight    10/25/17 0147  63 1 (139 11)    10/24/17 2132  63 5 (140)            Invasive Devices     Peripheral Intravenous Line            Peripheral IV 10/24/17 Right Forearm less than 1 day                  Physical Exam   Constitutional: She is oriented to person, place, and time  No distress  HENT:   Head: Normocephalic and atraumatic  Eyes: Conjunctivae and EOM are normal    Neck: Normal range of motion  Neck supple  Cardiovascular: Normal rate, regular rhythm, normal heart sounds and intact distal pulses  Pulmonary/Chest: Effort normal and breath sounds normal    Abdominal: Soft  Bowel sounds are normal    Musculoskeletal: Normal range of motion  Neurological: She is alert and oriented to person, place, and time  Skin: Skin is warm and dry  She is not diaphoretic  Psychiatric: She has a normal mood and affect  Nursing note and vitals reviewed          LABORATORY RESULTS:    Results from last 7 days  Lab Units 10/25/17  0503 10/25/17  0211 10/24/17  6241 TROPONIN I ng/mL <0 02 <0 02 <0 02     CBC with diff:   Results from last 7 days  Lab Units 10/25/17  0503 10/24/17  2259   WBC Thousand/uL 7 93 10 04   HEMOGLOBIN g/dL 13 5 14 5   HEMATOCRIT % 40 6 43 1   MCV fL 89 88   PLATELETS Thousands/uL 278 289   MCH pg 29 7 29 7   MCHC g/dL 33 3 33 6   RDW % 13 2 13 1   MPV fL 11 7 11 4       CMP:  Results from last 7 days  Lab Units 10/25/17  0503 10/24/17  2259   SODIUM mmol/L 142 141   POTASSIUM mmol/L 2 7* 2 7*   CHLORIDE mmol/L 104 101   CO2 mmol/L 30 30   ANION GAP mmol/L 8 10   BUN mg/dL 11 10   CREATININE mg/dL 0 89 0 98   GLUCOSE RANDOM mg/dL 100 111   CALCIUM mg/dL 7 9* 8 5   AST U/L  --  20   ALT U/L  --  25   ALK PHOS U/L  --  78   TOTAL PROTEIN g/dL  --  7 5   ALBUMIN g/dL  --  3 5   BILIRUBIN TOTAL mg/dL  --  0 30   EGFR ml/min/1 73sq m 82 73       BMP:  Results from last 7 days  Lab Units 10/25/17  0503 10/24/17  2259   SODIUM mmol/L 142 141   POTASSIUM mmol/L 2 7* 2 7*   CHLORIDE mmol/L 104 101   CO2 mmol/L 30 30   BUN mg/dL 11 10   CREATININE mg/dL 0 89 0 98   GLUCOSE RANDOM mg/dL 100 111   CALCIUM mg/dL 7 9* 8 5                 Results from last 7 days  Lab Units 10/25/17  0845   MAGNESIUM mg/dL 1 7         Imaging: I have personally reviewed pertinent reports  and I have personally reviewed pertinent films in PACS  Xr Chest 2 Views  Result Date: 10/25/2017  Narrative: CHEST - DUAL ENERGY INDICATION:  74-year-old woman with chest pain  COMPARISON:  Chest radiograph 8/23/2009 VIEWS:  PA (including soft tissue/bone algorithms) and lateral projections IMAGES:  4 FINDINGS:     Cardiomediastinal silhouette appears unremarkable  The lungs are clear  No pneumothorax or pleural effusion  Visualized osseous structures appear within normal limits for the patient's age  Surgical clips overlying the base of the neck are noted  Impression: No active pulmonary disease   Workstation performed: [id number]     Ct Head Without Contrast  Result Date: 10/24/2017  Narrative: CT BRAIN - WITHOUT CONTRAST INDICATION:  Headache and dizziness  COMPARISON:  None  TECHNIQUE:  CT examination of the brain was performed  In addition to axial images, coronal reformatted images were created and submitted for interpretation  Radiation dose length product (DLP) for this visit:  1271 mGy-cm   This examination, like all CT scans performed in the [facility], was performed utilizing techniques to minimize radiation dose exposure, including the use of iterative reconstruction and automated exposure control  IMAGE QUALITY:  Diagnostic  FINDINGS:  PARENCHYMA:  No intracranial mass, mass effect or midline shift  No CT signs of acute infarction  There is no parenchymal hemorrhage  VENTRICLES AND EXTRA-AXIAL SPACES:  Normal for patient's age  VISUALIZED ORBITS AND PARANASAL SINUSES:  Unremarkable  CALVARIUM AND EXTRACRANIAL SOFT TISSUES:   Normal    Impression: No acute intracranial abnormality  Workstation performed: [id number]       Telemetry reviewed personally: SR    Assessment  Principal Problem:    Chest pain  Active Problems:    Hyperlipidemia    Hypertension    Postoperative hypothyroidism    Hypokalemia    Tobacco abuse      Assessment  Chest pain  This is most likely secondary to hypertensive urgency due to non compliance  Persistent hypokalemia (2 7) - this is likely due to persistent intractable vomiting, secondary to hypertensive urgency, and is being repleted by the primary team  Hypomagnesemia, mild at 1 7- mag ox 400 mg po BID  Hypertension, presenting with urgency  She is now on Lotrel 5/20 daily and had controlled BP on that in the past  Possible CHASE by history  Hyperlipidemia-currently on atorvastatin 20 mg p o  Daily  Family history of premature CAD  Tobacco abuse  History of papillary thyroid CA-S/P total thyroidectomy and NUÑEZ  Post surgical hypothyroidism- on replacement   would check a TSH if not already done     Plan  A TSH is pending  Echocardiogram  Aggressively replete potassium- per SLIM  Screen for CHASE as an OP  Home BP monitoring  Bring numbers into the office for a review  An OP stress echo will be considered once her BP is improved  Low sodium diet and medical adherence has been reinforced  Magnesium oxide 400 mg p o  B i d  Advised tobacco cessation      Thank you for allowing us to participate in this patient's care  She will follow up with Dr Rashmi Chi once discharged   Counseling / Coordination of Care  Total floor / unit time spent today 45 minutes  Greater than 50% of total time was spent with the patient and / or family counseling and / or coordination of care  A description of the counseling / coordination of care: Review of history, current assessment, development of a plan  Code Status: Level 1 - Full Code    ** Please Note: Dragon 360 Dictation voice to text software may have been used in the creation of this document   **

## 2017-10-25 NOTE — CASE MANAGEMENT
Initial Clinical Review    Admission: Date/Time/Statement: 10/25/17 @ 0107 Observation Written     Orders Placed This Encounter   Procedures    Place in Observation (expected length of stay for this patient is less than two midnights)     Standing Status:   Standing     Number of Occurrences:   1     Order Specific Question:   Admitting Physician     Answer:   Alexander Ken     Order Specific Question:   Level of Care     Answer:   Med Surg [16]         ED: Date/Time/Mode of Arrival:   ED Arrival Information     Expected Arrival Acuity Means of Arrival Escorted By Service Admission Type    - 10/24/2017 21:17 Emergent Walk-In Self General Medicine Emergency    Arrival Complaint    Chest Pain; Neck Pain          Chief Complaint:   Chief Complaint   Patient presents with    Neck Pain     pt c/o extreme neck, back, shoulder pain starting saturday and just "haven't felt right since" seen at [facility], sent for High BP and chest pain started today  History of Illness: Lydia Trejo is a 44 y o  female who presents with headache, lightheadedness, chest pain, palpitations x4days  Patient states that she has not been taking her blood pressure medications for the past 2 months because they had a lapse in their insurance coverage and she could not see her PCP for a refill  She states that over the past 2 months, she has not checked her blood pressure  She has noticed that she has been having more frequent headaches over the past 2 months  She developed one on Saturday that was a 10/10, throbbing all over her head with associated nausea and vomiting  Patient states that Saturday night in to Sunday she was unable to keep down even sips of water without vomiting  Additionally she has had a 4/10 dull ache in the center of her chest that has been on and off over the past 4 days  She denies radiation of pain  She reports associated palpitations      ED Vital Signs:   ED Triage Vitals [10/24/17 2132] Temperature Pulse Respirations Blood Pressure SpO2   98 9 °F (37 2 °C) 89 18 (!) 224/136 97 %      Temp Source Heart Rate Source Patient Position - Orthostatic VS BP Location FiO2 (%)   Oral Monitor Sitting Left arm --      Pain Score       6        Wt Readings from Last 1 Encounters:   10/25/17 63 1 kg (139 lb 1 8 oz)       Vital Signs (abnormal):   Date/Time  BP   10/25/17 0147   158/110   10/25/17 0132   155/104   10/25/17 0030   167/114   10/24/17 2341   176/115   10/24/17 2245   209/128   10/24/17 2230   176/121   10/24/17 2215   179/117   10/24/17 2200   178/115   10/24/17 2145   203/124   10/24/17 2133   209/128     Abnormal Labs/Diagnostic Test Results:   POTASSIUM 2 7*     CT Head:  No acute intracranial abnormality  EKG:  NSR  CXR pending    ED Treatment:   Medication Administration from 10/24/2017 2117 to 10/25/2017 0142       Date/Time Order Dose Route Action     10/24/2017 2300 acetaminophen (TYLENOL) tablet 650 mg 650 mg Oral Given     10/24/2017 2335 labetalol (NORMODYNE) injection 10 mg 10 mg Intravenous Given     10/24/2017 2347 potassium chloride 20 mEq IVPB (premix) 20 mEq Intravenous New Bag     10/24/2017 2346 potassium chloride (K-DUR,KLOR-CON) CR tablet 20 mEq 20 mEq Oral Given     10/25/2017 0038 labetalol (NORMODYNE) injection 10 mg 10 mg Intravenous Given     10/25/2017 0113 aspirin tablet 325 mg 325 mg Oral Given          Past Medical/Surgical History:    Active Ambulatory Problems     Diagnosis Date Noted    No Active Ambulatory Problems     Resolved Ambulatory Problems     Diagnosis Date Noted    No Resolved Ambulatory Problems     Past Medical History:   Diagnosis Date    Cancer ([facility] [address]     Hyperlipidemia     Hypertension     Hypokalemia 10/25/2017    Postoperative hypothyroidism 10/25/2017    Tobacco abuse 10/25/2017       Admitting Diagnosis: Chest pain [R07 9]  Hypertensive urgency [I16 0]    Age/Sex: 44 y o  female    Assessment/Plan:   Principal Problem:    Chest pain  Active Problems:    Hyperlipidemia    Hypertension    Postoperative hypothyroidism    Hypokalemia    Tobacco abuse     Plan for the Primary Problem(s):  · Chest pain  ? CXR- No acute intracranial abnormality  ? EKG- NSR  ? Troponin <0 02  Will trend  ? Nitro, Morphine, ASA  ? EKG for chest pain  ? Telemetry      Plan for Additional Problems:   · HTN with urgency- Dizziness, HA,chest pain, palpitations on arrival- likely secondary to prolonged HTN  Non-compliant with BP meds for last 2 months secondary to insurance lack of insurance coverage- now has insurance  /136 on arrival  Improved to 167/114 with labetalol 10mg x2  CT Head- No acute intracranial abnormality  Restart home medications  CM consult for insurance coverage  Add hydralazine, lopressor PRN  · Hypokalemia- K 2 7  PO, IV K given in ED  Monitor and replete as necessary  · HLD- Continue statin  · Hypothyroidism- Thyroid cancer s/p thyroidectomy  Continue synthroid  · Tobacco abuse- Refused Nicotine patch  Encourage cessation      VTE Prophylaxis: Low Risk  / sequential compression device   Code Status: Full Code  POLST: POLST form is not discussed and not completed at this time      Anticipated Length of Stay:  Patient will be admitted on an Observation basis with an anticipated length of stay of  Less than 2 midnights     Justification for Hospital Stay: CP, HTN urgency, hypokalemia      Admission Orders:  Telemetry  Trop q3h  Incentive spirometry     Scheduled Meds:   amLODIPine-benazepril (LOTREL 5/20) combo dose  Oral Daily   [START ON 10/26/2017] aspirin 81 mg Oral Daily   atorvastatin 20 mg Oral Daily   escitalopram 5 mg Oral Daily   levothyroxine 112 mcg Oral Early Morning   potassium chloride 20 mEq Intravenous Q2H     Continuous Infusions:   sodium chloride 100 mL/hr Last Rate: 100 mL/hr (10/25/17 0240)     PRN Meds:   Acetaminophen 650mg PO x1 thus far    hydrALAZINE    influenza vaccine    metoprolol    morphine injection    nitroglycerin    ondansetron

## 2017-10-25 NOTE — DISCHARGE INSTRUCTIONS
Please check your potassium tomorrow and follow up results with your PCP     Call Dr Tosha Aquino and follow up with him regarding outpatient stress test

## 2017-10-26 DIAGNOSIS — R07.9 CHEST PAIN: ICD-10-CM

## 2017-10-26 LAB
ATRIAL RATE: 72 BPM
P AXIS: 51 DEGREES
PR INTERVAL: 154 MS
QRS AXIS: 38 DEGREES
QRSD INTERVAL: 80 MS
QT INTERVAL: 460 MS
QTC INTERVAL: 503 MS
T WAVE AXIS: 38 DEGREES
VENTRICULAR RATE: 72 BPM

## 2017-11-01 LAB
ALDOST SERPL-MCNC: 14.5 NG/DL (ref 0–30)
RENIN PLAS-CCNC: 0.74 NG/ML/HR (ref 0.17–5.38)

## 2017-11-07 ENCOUNTER — TRANSCRIBE ORDERS (OUTPATIENT)
Dept: ADMINISTRATIVE | Facility: HOSPITAL | Age: 37
End: 2017-11-07

## 2017-11-07 DIAGNOSIS — R53.83 OTHER FATIGUE: Primary | ICD-10-CM

## 2018-01-10 NOTE — PROGRESS NOTES
Assessment    1  Thyroid cancer (193) (C73)   2  Postsurgical hypothyroidism (244 0) (E89 0)   3  Hypertension (401 9) (I10)    Plan  Postsurgical hypothyroidism    · (1) T4, FREE; Status:Active; Requested for:16May2016;    Perform:[facility]; JLK:[id number];ZUCJDYU; For:Postsurgical hypothyroidism; Ordered By:Miguel A Soni;   · (1) TSH; Status:Active; Requested for:16May2016;    Perform:[facility]; QGL:[id number];CRNDTVI; For:Postsurgical hypothyroidism; Ordered By:Miguel A Soni; Thyroid cancer    · (1) THYROGLOBULIN/QUANT W/ANTIBODY PANEL; Status:Active; Requested  for:16May2016;    Perform:[facility]; IRR:[id number];ODAYCDK; For:Thyroid cancer; Ordered By:Miguel A Soni;   · US HEAD NECK LYMPH NODE MAPPING; Status:Hold For - Scheduling; Requested  for:32Cwj6589;    Perform:[facility]; DIW:[id number];GSWLIVI; For:Thyroid cancer; Ordered By:Miguel A Soni;   · Follow-up visit in 6 months Evaluation and Treatment  Follow-up  Status: Complete   Done: 20Apr2016   Ordered; For: Thyroid cancer; Ordered By: Lopez Ro Performed:  Due: 25Apr2016; Last Updated By: Gabriel Arias; 4/20/2016 5:01:18 PM     Thyroid Cancer: Check Thyroglobulin Panel in May and U/S in July  After that, will schedule Thyrogen stimulated testing for October  Hypothyroidism: Continue TSH Suppression with Synthroid, Check TSH/Free T4 next month  HTN: BP Slightly high today but generally well controlled  , she will continue current medication and monitor and f/u with family physician    F/U in 6 months with Dr Alexsander Aldrich     Chief Complaint  Chief Complaint Free Text Note Form: Follow Up      History of Present Illness  HPI: Michelle Sorto is a 77-year-old woman who is here for follow up of Thyroid Cancer and Post-Surgical Hypothyroidism  In December 2013 her gynecologist felt a thyroid nodule and thus sent her for a thyroid ultrasound   After that she underwent fine needle aspiration biopsy of an isthmus nodule which was suspicious for thyroid cancer  That she underwent total thyroidectomy which showed multifocal papillary thyroid cancer  NUÑEZ Therapy was completed 3/5/2014  CT scan 10/2014 showed mildly enlarged lymph nodes  Lymph nodes have been stable on ultrasound    She had WBS 12/2014 which showed no recurrent/metastatic disease  Thyrogen stimulated Thyroglobulin level 10/2015 was low at 0 2      She has been taking Levothyroxine 112mcg Mon-Sat and 2 tabs on Sunday  Overall she has been feeling well  She does have HTN and Hyperlipidemia, taking meds and following with family physician  BP is generally well controlled  Review of Systems  Endo Adult ROS Female Established v2 - [facility]:   Constitutional/General: no recent weight gain, no recent weight loss, no poor energy/fatigue, no increased energy level, no insomnia/sleep problems, no fever and no feeling weak  Breasts: no nipple discharge  Heart: high blood pressure, but no chest pain/tightness, no rapid/racing heart rate and no palpitations  Genitourinary - Urinary no frequent urination, no excess urination and no urinating during the night  Eyes: no blurred vision, no double vision, no bulging eyes, no gritty/scratchy eyes and no excessive tearing  Mouth / Throat: no hoarseness and no difficulty swallowing  Neck: no lumps, no swollen glands, no neck pain, no neck stiffness and no enlarged thyroid  Respiratory: no wheezing, no asthma and no persistent cough  Musculoskeletal: no muscle aches/pain, no joint aches/pain and no muscle weakness  Skin & Hair: no dry skin, no acne, the hair texture was not oily, no hair loss and no excessive hair growth  Gastrointestinal: no constipation, no diarrhea, no waking at night to drink and no stomach ache  Neurological: no blackouts, no weakness and no tremors     Reproductive: periods occur every 28-29 days, periods usually lasts 3 days, unknown, periods are regular, no discomfort with periods, no excessive bleeding with periods and no mood swings  Endocrine: no feeling hot frequently, no feeling cold frequently, no shifts between feeling hot and cold, no cold hands or feet, no excessive sweating, no thyroid problems, no blood sugar problems, no excessive thirst, no excessive hunger, no change in shoe size, no nausea or vomiting and no shaky hands  ROS Reviewed:   ROS reviewed  Active Problems    1  Bacterial vaginosis (616 10,041 9) (N76 0,B96 89)   2  Gynecologic Services Intrauterine Device (IUD) Insertion   3  Nontoxic single thyroid nodule (241 0) (E04 1)   4  Postsurgical hypothyroidism (244 0) (E89 0)   5  Pregnancy Complicated By Hypertension - Postpartum Condition Or Prior Complicated   Delivery (807 34)   6  Routine Gynecological Exam With Cervical Pap Smear (V72 31)   7  Thyroid cancer (193) (C73)   8  Vulvovaginitis candida albicans (112 1) (B37 3)    Past Medical History    1  History of human papillomavirus infection (V12 09) (Z86 19)   2  History of varicella (V12 09) (Z86 19)  Active Problems And Past Medical History Reviewed: The active problems and past medical history were reviewed and updated today  Surgical History    1  History of Colposcopy   2  Gynecologic Services Intrauterine Device (IUD) Insertion   3  History of Thyroid Surgery Total Thyroidectomy  Surgical History Reviewed: The surgical history was reviewed and updated today  Family History  Mother    1  Family history of Colon Cancer (V16 0)   2  Family history of Uterine Cancer (V16 49)  Father    3  Family history of Hypothyroidism   4  Family history of Malignant Lymphoma (V16 7)  Sister    5  Family history of Hypothyroidism  Paternal Grandmother    10  Family history of Hypothyroidism  Paternal Aunt    9  Family history of Hypothyroidism  Family History    8  Family history of Arthritis (V17 7)   9  Family history of Diabetes Mellitus (V18 0)   10   Family history of Heart Disease (V17 49)   11  Family history of Hypertension (V17 49)  Family History Reviewed: The family history was reviewed and updated today  Social History    · Denied: History of Alcohol Use (History)   · Caffeine Use   · Denied: History of Drug Use   · History of Former smoker (M52 93) (X45 455)   · Sexually Active  Social History Reviewed: The social history was reviewed and updated today  The social history was reviewed and is unchanged  Current Meds   1  AmLODIPine Besylate 5 MG Oral Tablet Recorded   2  Atorvastatin Calcium 80 MG Oral Tablet; TAKE 1 TABLET AT BEDTIME; Therapy: [id number] to (Evaluate:08Oct2016); Last Rx:14Oct2015 Ordered   3  Benazepril HCl - 20 MG Oral Tablet; Take 1 tablet daily; Therapy: 66WIA9311 to (Last Rx:14Oct2015) Ordered   4  BuSpar TABS Recorded   5  Levothyroxine Sodium 112 MCG Oral Tablet; 1 Tab Mon-Sat, 2 Tabs Sun;   Therapy: 28Apr2014 to (Farooq Ochoa)  Requested for: [id number]; Last   Rx:14Oct2015 Ordered   6  Lexapro 10 MG Oral Tablet Recorded  Medication List Reviewed: The medication list was reviewed and updated today  Allergies    1  No Known Drug Allergies    Vitals  Vital Signs [Data Includes: Current Encounter]    Recorded: 20Apr2016 04:37PM   Heart Rate 98   Systolic 280   Diastolic 78   Height 5 ft 1 in   Weight 141 lb 0 64 oz   BMI Calculated 26 65   BSA Calculated 1 63     Physical Exam    Constitutional   General appearance: No acute distress, well appearing and well nourished  Eyes   Conjunctiva and lids: No swelling, erythema, or discharge  Pupils: Equal, round and reactive to light  The sclera are anicteric  Extraocular movements are intact  Ears, Nose, Mouth, and Throat   External inspection of ears, nose and lips: Normal     Oropharynx: Normal with no erythema, edema, exudate or lesions  Exam of Head: The head is atraumatic and normocephalic  Neck: The neck is supple   The thyroid is normal in size with no palpable nodules  Pulmonary   Auscultation of lungs: Clear to auscultation bilaterally with normal chest expansion  Cardiovascular   Auscultation of heart: Normal rate and rhythm with no murmurs, gallops or rubs  Examination of pulses: Dorsalis pedal pulses are +2 and equal bilaterally  Examination of carotids: No bruit    Abdomen   Abdomen: Abdomen is soft, non-tender with normal bowel sounds  Lymphatic   Palpation of lymph nodes: No supraclavicular or suboccipital lymphadenopathy  Musculoskeletal   Inspection/palpation of joints, bones, and muscles: Muscle bulk and tone is normal     Skin   Skin and subcutaneous tissue: Normal skin temperature and color  Neurologic   Reflexes: 2+ and symmetric  Motor Strength: Strength is 5/5 bilaterally  Psychiatric   Orientation to person, place and time: Normal     Mood and affect: Affect and attention span are normal        Results/Data  Results   (1) THYROGLOBULIN/QUANT W/ANTIBODY PANEL [id number] 12:08PM Yordy Garces Order Number: [id number]     Test Name Result Flag Reference   THYROGLOBULIN, QUANT CANCELED     THYROGLOB AB CANCELED IU/mL     Thyroglobulin Antibody measured by [facility] Methodology  Performed at:  [facility]  [address]  [id number]  : Elie Neville MD, Phone:  [phone]  The released value <1 0 was canceled by TSW on 10/26/2015 09:37   THYROGLOBULIN (TG-ALYSSIA) CANCELED       U/S Head and Neck ( Soft Tissue) [id number] 11:19AM Meryle Peng     Test Name Result Flag Reference   U/S Head and Neck (Report)     [address];;[place];[id number]   07/10/2015 1120   07/10/2015 1145       NECK ULTRASOUND     INDICATION-  History of papillary carcinoma, thyroidectomy       COMPARISON- Ultrasound 11/7/2014, iodine-131 whole body scan with   origin stimulation for tumor localization study dated 12/12/2014     FINDINGS-     Ultrasound of the thyroidectomy bed and cervical lymph node chains was   performed with a high frequency linear transducer  There is no suspicion of recurrent mass in the thyroidectomy bed  Lymph nodes maintain normal morphologic contour, echogenicity and short   axis dimensions of less than 0 7 cm  No evidence for   microcalcification or focal nodularity  Mildly prominent left submandibular lymph node is again seen with short   axis at 7 mm  No suspicious internal features  IMPRESSION-      No evidence of recurrent or metastatic disease  Transcribed on- [id number]     [facility], RAD DO   Reading Radiologist- BETTY Lee DO   Electronically Signed- BETTY Lee DO   Released Date Time- 07/10/15 1320   ------------------------------------------------------------------------------   [id number]^ISA BOWEN   [address]^ISA [address] [place] [address] Management  Routine Gynecological Exam With Cervical Pap Smear   BREAST EXAM; every 1 year; Next Due: [id number]; Overdue  PELVIC EXAM; every 1 year; Next Due: [id number]; Overdue    Future Appointments    Date/Time Provider Specialty Site   10/27/2016 11:20 AM RAGINI Avendano  Endocrinology [address] ENDOCRINOLOGY     Signatures   Electronically signed by : Fritz Harrison, [facility];  Apr 25 2016  9:38AM EST                       (Author)    Electronically signed by : RAGINI Villalta ; Apr 25 2016 11:21AM EST                       (Author)

## 2018-01-12 ENCOUNTER — HOSPITAL ENCOUNTER (OUTPATIENT)
Dept: NUCLEAR MEDICINE | Facility: HOSPITAL | Age: 38
Discharge: HOME | End: 2018-01-12

## 2018-01-12 ENCOUNTER — GENERIC CONVERSION - ENCOUNTER (OUTPATIENT)
Dept: OTHER | Facility: OTHER | Age: 38
End: 2018-01-12

## 2018-01-12 DIAGNOSIS — C73 MALIGNANT NEOPLASM OF THYROID GLAND (HCC): ICD-10-CM

## 2018-01-12 NOTE — RESULT NOTES
Message   ultrasound normal   Will send order to [facility] for thyrogen stimulated thyroglobulin testing     Verified Results  [address] [id number] 12:55PM Karin Maurice Order Number: [id number]     Test Name Result Flag Reference   US HEAD NECK LYMPH NODE [address] [facility] (Report)     NECK ULTRASOUND     INDICATION:  History of papillary carcinoma  COMPARISON: 7/8/2016     FINDINGS:     Ultrasound of the thyroidectomy bed and cervical lymph node chains was performed with a high frequency linear transducer  There is no suspicion of recurrent mass in the thyroidectomy bed  Lymph nodes maintain normal morphologic contour, echogenicity and short axis dimensions of less than 0 7 cm  No evidence for microcalcification or focal nodularity  IMPRESSION:     No evidence of recurrent or metastatic disease  Workstation performed: [id number]     Signed by:   Marisel Lassiter DO   11/22/16       Plan  Thyroid cancer    · (1) THYROGLOBULIN/QUANT W/ANTIBODY PANEL; Status:Active;  Requested  for:22Nov2016;

## 2018-01-15 NOTE — RESULT NOTES
Message   Ultrasound shows a new lymph node that wasn't seen before, however it is a normal appearing lymph node so not very worrisome as long as the thyroglobulin blood test comes back OK  She has labs ordered for may including TSH, Free T4 and Thyroglobulin panel- please remind her to get those done now and then repeat the ultrasound in 3 months prior to next visit to monitor the lymph node  Ultrasound order placed please mail     Verified Results  [address] [place] [address] [id number] 01:19PM Evans Crew Order Number: [id number]     Test Name Result Flag Reference   [address] [place] [address] (Report)     NECK ULTRASOUND     INDICATION:  History of papillary carcinoma  Status post thyroidectomy  Evaluate lymph node status     COMPARISON: 7/10/2015     FINDINGS:     Ultrasound of the thyroidectomy bed and cervical lymph node chains was performed with a high frequency linear transducer  No recurrent thyroid tissue is identified in the thyroid bed however visualized on this study, not seen earlier is the presence of what appears to be a cervical node in the left thyroidectomy bed measuring 1 2 x 0 2 x 0 4 cm  There is normal    architecture and vascularity  Otherwise the lymph nodes maintain normal morphologic contour, echogenicity and short axis dimensions of less than 0 7 cm  No evidence for microcalcification or focal nodularity  IMPRESSION:     1  An apparent lymph node is identified within the left thyroidectomy bed measuring 1 2 x 0 2 x 0 4 cm  Recommend correlation with thyroglobulin levels  Normal, reassessment with closer follow-up thyroid ultrasonography in 3-6 months is recommended  2  Otherwise unremarkable lymph node mapping study        ##fuslh6##fuslh6       Workstation performed: [id number]     Signed by:   Dinora Barros MD   7/8/16

## 2018-02-05 ENCOUNTER — APPOINTMENT (OUTPATIENT)
Dept: LAB | Age: 38
End: 2018-02-05
Payer: COMMERCIAL

## 2018-02-05 ENCOUNTER — TRANSCRIBE ORDERS (OUTPATIENT)
Dept: ADMINISTRATIVE | Age: 38
End: 2018-02-05

## 2018-02-05 ENCOUNTER — HOSPITAL ENCOUNTER (OUTPATIENT)
Dept: RADIOLOGY | Age: 38
Discharge: HOME | End: 2018-02-05
Payer: COMMERCIAL

## 2018-02-05 DIAGNOSIS — C73 MALIGNANT NEOPLASM OF THYROID GLAND (HCC): ICD-10-CM

## 2018-02-05 DIAGNOSIS — C73 MALIGNANT NEOPLASM OF THYROID GLAND (HCC): Primary | ICD-10-CM

## 2018-02-05 LAB — B-HCG SERPL-ACNC: <2 MIU/ML

## 2018-02-05 PROCEDURE — 84702 CHORIONIC GONADOTROPIN TEST: CPT

## 2018-02-05 PROCEDURE — 36415 COLL VENOUS BLD VENIPUNCTURE: CPT

## 2018-02-05 RX ADMIN — THYROTROPIN ALFA 0.9 MG: 0.9 INJECTION, POWDER, FOR SOLUTION INTRAMUSCULAR at 10:00

## 2018-02-06 ENCOUNTER — HOSPITAL ENCOUNTER (OUTPATIENT)
Dept: RADIOLOGY | Age: 38
Discharge: HOME | End: 2018-02-06
Payer: COMMERCIAL

## 2018-02-06 RX ADMIN — THYROTROPIN ALFA 0.9 MG: 0.9 INJECTION, POWDER, FOR SOLUTION INTRAMUSCULAR at 09:45

## 2018-02-07 ENCOUNTER — HOSPITAL ENCOUNTER (OUTPATIENT)
Dept: RADIOLOGY | Facility: HOSPITAL | Age: 38
Discharge: HOME | End: 2018-02-07
Payer: COMMERCIAL

## 2018-02-07 PROCEDURE — A9509 IODINE I-123 SOD IODIDE MIL: HCPCS

## 2018-02-07 PROCEDURE — 78018 THYROID MET IMAGING BODY: CPT

## 2018-08-23 ENCOUNTER — TRANSCRIBE ORDERS (OUTPATIENT)
Dept: ADMINISTRATIVE | Facility: HOSPITAL | Age: 38
End: 2018-08-23

## 2018-08-23 DIAGNOSIS — C73 THYROID CANCER (HCC): Primary | ICD-10-CM

## 2018-08-30 ENCOUNTER — HOSPITAL ENCOUNTER (OUTPATIENT)
Dept: ULTRASOUND IMAGING | Facility: HOSPITAL | Age: 38
Discharge: HOME | End: 2018-08-30
Payer: COMMERCIAL

## 2018-08-30 DIAGNOSIS — C73 THYROID CANCER (HCC): ICD-10-CM

## 2018-08-30 PROCEDURE — 76536 US EXAM OF HEAD AND NECK: CPT

## 2018-10-17 ENCOUNTER — OFFICE VISIT (OUTPATIENT)
Dept: OBGYN CLINIC | Facility: CLINIC | Age: 38
End: 2018-10-17
Payer: COMMERCIAL

## 2018-10-17 VITALS — SYSTOLIC BLOOD PRESSURE: 138 MMHG | BODY MASS INDEX: 27.78 KG/M2 | WEIGHT: 147 LBS | DIASTOLIC BLOOD PRESSURE: 84 MMHG

## 2018-10-17 DIAGNOSIS — N64.4 MASTODYNIA: Primary | ICD-10-CM

## 2018-10-17 PROCEDURE — 99201 PR OFFICE OUTPATIENT NEW 10 MINUTES: CPT | Performed by: PHYSICIAN ASSISTANT

## 2018-10-17 RX ORDER — VARENICLINE TARTRATE 1 MG/1
TABLET, FILM COATED ORAL
COMMUNITY
Start: 2017-10-31 | End: 2022-08-09 | Stop reason: ALTCHOICE

## 2018-10-17 RX ORDER — LEVOTHYROXINE SODIUM 200 UG/1
CAPSULE ORAL DAILY
COMMUNITY
Start: 2018-10-05

## 2018-10-17 NOTE — PROGRESS NOTES
Ash Stewart  1980    S:  43 y o  female here for a problem visit  She notes that for the past two months she has developed two to three days of bilateral breast tenderness and swelling that spontaneously resolved  She currently is asymptomatic  She has no family history of breast or ovarian cancer  She has a family history of endometrial cancer in her mother  She has a Mirena IUD in place that is due for swap  She is amenorrheic with this  We discussed the fact that her Mirena is reducing her risk of endometrial cancer       Past Medical History:   Diagnosis Date    Abnormal Pap smear of cervix     Cancer ([facility] [address]     thyroid removed    HPV (human papilloma virus) infection     Hyperlipidemia     Hypertension     Hypokalemia 10/25/2017    Postoperative hypothyroidism 10/25/2017    Thyroid cancer ([facility] [address]     Tobacco abuse 10/25/2017     Family History   Problem Relation Age of Onset    Uterine cancer Mother     Hypothyroidism Father     Lymphoma Father     Hypothyroidism Sister     Hypothyroidism Paternal Grandmother     Hypothyroidism Paternal Aunt      Social History     Social History    Marital status: /Civil Union     Spouse name: N/A    Number of children: N/A    Years of education: N/A     Social History Main Topics    Smoking status: Current Some Day Smoker     Packs/day: 0 25     Years: 15 00     Types: Cigarettes    Smokeless tobacco: Never Used    Alcohol use No    Drug use: No    Sexual activity: Yes     Partners: Male     Birth control/ protection: IUD      Comment: Mirena 2013     Other Topics Concern    None     Social History Narrative    None       O:  /84 (BP Location: Right arm, Patient Position: Sitting, Cuff Size: Standard)   Wt 66 7 kg (147 lb)   LMP  (LMP Unknown) Comment: Mirena 2013  BMI 27 78 kg/m²   She appears well and is in no distress  Abdomen is soft and nontender  Breasts are symmetrical without mass, tenderness, nipple discharge, or adenopathy  A/P:  Breast tenderness, resolved  Will observe for the next month  This sounds cyclical/hormonal in nature  If persistent, will check bilateral dx mammo and US  She will return for Mirena swap

## 2018-10-19 ENCOUNTER — TELEPHONE (OUTPATIENT)
Dept: OBGYN CLINIC | Facility: CLINIC | Age: 38
End: 2018-10-19

## 2018-10-19 NOTE — TELEPHONE ENCOUNTER
Spoke with Vera Castañeda @ [facility] is cover 100% no copay, no deductible  Ref# [id number]  Pt is scheduled for Nov 7 in [place]  Thank you

## 2018-11-07 ENCOUNTER — OFFICE VISIT (OUTPATIENT)
Dept: OBGYN CLINIC | Facility: CLINIC | Age: 38
End: 2018-11-07
Payer: COMMERCIAL

## 2018-11-07 VITALS — DIASTOLIC BLOOD PRESSURE: 74 MMHG | WEIGHT: 147 LBS | SYSTOLIC BLOOD PRESSURE: 122 MMHG | BODY MASS INDEX: 27.78 KG/M2

## 2018-11-07 DIAGNOSIS — Z30.430 ENCOUNTER FOR INSERTION OF MIRENA IUD: Primary | ICD-10-CM

## 2018-11-07 PROCEDURE — 58300 INSERT INTRAUTERINE DEVICE: CPT | Performed by: PHYSICIAN ASSISTANT

## 2018-11-07 NOTE — PROGRESS NOTES
Danyell Booth  1980      CC:  IUD swap    S:  43 y o  female here for IUD swap  The reason for her swap is  Mirena  She requests Mirena IUD  We reviewed the risks of IUD insertion including pain, perforation, migration, perforation, irregular bleeding, and infection  She is aware that with Mirena she can expect up to 6 months of irregular bleeding and at that point her periods should become lighter, shorter, and less crampy, and that many women stop having periods with their Mirena  She did premedicate with ibuprofen and a snack prior to insertion  Written consent was obtained from the patient, myself, and Dr Anabela Mir prior to the procedure  O:   Blood pressure 122/74, weight 66 7 kg (147 lb)  Patient appears well and is not in distress  Abdomen is soft and nontender  External genitals are normal without lesion or rash  Vagina is normal with menses present  Cervix is normal without lesion  PROCEDURE:   IUD strings were visualized, grasped with a ring forceps, and removed without difficulty  The cervix was cleansed with Betadine  The uterus was sounded to 7 cm  IUD was inserted to the fundus without dilation and without tenaculum  Strings were trimmed to 3cm    The patient tolerated the procedure well without complication  A:  IUD swap    P: The patient will return in one month for IUD check but knows to call sooner should she have problems prior to that visit

## 2021-06-04 ENCOUNTER — HOSPITAL ENCOUNTER (EMERGENCY)
Facility: HOSPITAL | Age: 41
Discharge: HOME | End: 2021-06-04
Attending: EMERGENCY MEDICINE | Admitting: EMERGENCY MEDICINE
Payer: COMMERCIAL

## 2021-06-04 VITALS
DIASTOLIC BLOOD PRESSURE: 101 MMHG | RESPIRATION RATE: 16 BRPM | HEART RATE: 81 BPM | TEMPERATURE: 98.8 F | SYSTOLIC BLOOD PRESSURE: 157 MMHG | OXYGEN SATURATION: 95 %

## 2021-06-04 DIAGNOSIS — N12 PYELONEPHRITIS: Primary | ICD-10-CM

## 2021-06-04 LAB
ALBUMIN SERPL BCP-MCNC: 3.9 G/DL (ref 3.5–5)
ALP SERPL-CCNC: 76 U/L (ref 46–116)
ALT SERPL W P-5'-P-CCNC: 50 U/L (ref 12–78)
ANION GAP SERPL CALCULATED.3IONS-SCNC: 13 MMOL/L (ref 4–13)
AST SERPL W P-5'-P-CCNC: 26 U/L (ref 5–45)
BACTERIA UR QL AUTO: ABNORMAL /HPF
BASOPHILS # BLD AUTO: 0.06 THOUSANDS/ΜL (ref 0–0.1)
BASOPHILS NFR BLD AUTO: 1 % (ref 0–1)
BILIRUB SERPL-MCNC: 0.51 MG/DL (ref 0.2–1)
BILIRUB UR QL STRIP: NEGATIVE
BUN SERPL-MCNC: 10 MG/DL (ref 5–25)
CALCIUM SERPL-MCNC: 8.6 MG/DL (ref 8.3–10.1)
CHLORIDE SERPL-SCNC: 101 MMOL/L (ref 100–108)
CLARITY UR: ABNORMAL
CO2 SERPL-SCNC: 28 MMOL/L (ref 21–32)
COLOR UR: YELLOW
CREAT SERPL-MCNC: 0.98 MG/DL (ref 0.6–1.3)
EOSINOPHIL # BLD AUTO: 0.28 THOUSAND/ΜL (ref 0–0.61)
EOSINOPHIL NFR BLD AUTO: 3 % (ref 0–6)
ERYTHROCYTE [DISTWIDTH] IN BLOOD BY AUTOMATED COUNT: 13.3 % (ref 11.6–15.1)
EXT PREG TEST URINE: NEGATIVE
EXT. CONTROL ED NAV: NORMAL
GFR SERPL CREATININE-BSD FRML MDRD: 72 ML/MIN/1.73SQ M
GLUCOSE SERPL-MCNC: 110 MG/DL (ref 65–140)
GLUCOSE UR STRIP-MCNC: NEGATIVE MG/DL
HCT VFR BLD AUTO: 43.4 % (ref 34.8–46.1)
HGB BLD-MCNC: 14.5 G/DL (ref 11.5–15.4)
HGB UR QL STRIP.AUTO: ABNORMAL
IMM GRANULOCYTES # BLD AUTO: 0.06 THOUSAND/UL (ref 0–0.2)
IMM GRANULOCYTES NFR BLD AUTO: 1 % (ref 0–2)
KETONES UR STRIP-MCNC: NEGATIVE MG/DL
LACTATE SERPL-SCNC: 1.9 MMOL/L (ref 0.5–2)
LEUKOCYTE ESTERASE UR QL STRIP: ABNORMAL
LYMPHOCYTES # BLD AUTO: 2.6 THOUSANDS/ΜL (ref 0.6–4.47)
LYMPHOCYTES NFR BLD AUTO: 28 % (ref 14–44)
MCH RBC QN AUTO: 29.1 PG (ref 26.8–34.3)
MCHC RBC AUTO-ENTMCNC: 33.4 G/DL (ref 31.4–37.4)
MCV RBC AUTO: 87 FL (ref 82–98)
MONOCYTES # BLD AUTO: 0.8 THOUSAND/ΜL (ref 0.17–1.22)
MONOCYTES NFR BLD AUTO: 9 % (ref 4–12)
NEUTROPHILS # BLD AUTO: 5.6 THOUSANDS/ΜL (ref 1.85–7.62)
NEUTS SEG NFR BLD AUTO: 58 % (ref 43–75)
NITRITE UR QL STRIP: NEGATIVE
NON-SQ EPI CELLS URNS QL MICRO: ABNORMAL /HPF
NRBC BLD AUTO-RTO: 0 /100 WBCS
PH UR STRIP.AUTO: 7 [PH] (ref 4.5–8)
PLATELET # BLD AUTO: 340 THOUSANDS/UL (ref 149–390)
PMV BLD AUTO: 11.2 FL (ref 8.9–12.7)
POTASSIUM SERPL-SCNC: 3 MMOL/L (ref 3.5–5.3)
PROT SERPL-MCNC: 8.2 G/DL (ref 6.4–8.2)
PROT UR STRIP-MCNC: ABNORMAL MG/DL
RBC # BLD AUTO: 4.98 MILLION/UL (ref 3.81–5.12)
RBC #/AREA URNS AUTO: ABNORMAL /HPF
SODIUM SERPL-SCNC: 142 MMOL/L (ref 136–145)
SP GR UR STRIP.AUTO: <=1.005 (ref 1–1.03)
UROBILINOGEN UR QL STRIP.AUTO: 0.2 E.U./DL
WBC # BLD AUTO: 9.4 THOUSAND/UL (ref 4.31–10.16)
WBC #/AREA URNS AUTO: ABNORMAL /HPF

## 2021-06-04 PROCEDURE — 99284 EMERGENCY DEPT VISIT MOD MDM: CPT

## 2021-06-04 PROCEDURE — 87086 URINE CULTURE/COLONY COUNT: CPT

## 2021-06-04 PROCEDURE — 36415 COLL VENOUS BLD VENIPUNCTURE: CPT | Performed by: EMERGENCY MEDICINE

## 2021-06-04 PROCEDURE — 81001 URINALYSIS AUTO W/SCOPE: CPT

## 2021-06-04 PROCEDURE — 80053 COMPREHEN METABOLIC PANEL: CPT | Performed by: EMERGENCY MEDICINE

## 2021-06-04 PROCEDURE — 96361 HYDRATE IV INFUSION ADD-ON: CPT

## 2021-06-04 PROCEDURE — 81025 URINE PREGNANCY TEST: CPT | Performed by: EMERGENCY MEDICINE

## 2021-06-04 PROCEDURE — 96365 THER/PROPH/DIAG IV INF INIT: CPT

## 2021-06-04 PROCEDURE — 96375 TX/PRO/DX INJ NEW DRUG ADDON: CPT

## 2021-06-04 PROCEDURE — 85025 COMPLETE CBC W/AUTO DIFF WBC: CPT | Performed by: EMERGENCY MEDICINE

## 2021-06-04 PROCEDURE — 99284 EMERGENCY DEPT VISIT MOD MDM: CPT | Performed by: EMERGENCY MEDICINE

## 2021-06-04 PROCEDURE — 83605 ASSAY OF LACTIC ACID: CPT | Performed by: EMERGENCY MEDICINE

## 2021-06-04 RX ORDER — POTASSIUM CHLORIDE 20 MEQ/1
40 TABLET, EXTENDED RELEASE ORAL ONCE
Status: COMPLETED | OUTPATIENT
Start: 2021-06-04 | End: 2021-06-04

## 2021-06-04 RX ORDER — ONDANSETRON 2 MG/ML
4 INJECTION INTRAMUSCULAR; INTRAVENOUS ONCE
Status: COMPLETED | OUTPATIENT
Start: 2021-06-04 | End: 2021-06-04

## 2021-06-04 RX ORDER — KETOROLAC TROMETHAMINE 30 MG/ML
15 INJECTION, SOLUTION INTRAMUSCULAR; INTRAVENOUS ONCE
Status: COMPLETED | OUTPATIENT
Start: 2021-06-04 | End: 2021-06-04

## 2021-06-04 RX ORDER — CEPHALEXIN 500 MG/1
500 CAPSULE ORAL EVERY 6 HOURS SCHEDULED
Qty: 40 CAPSULE | Refills: 0 | Status: SHIPPED | OUTPATIENT
Start: 2021-06-04 | End: 2021-06-14

## 2021-06-04 RX ADMIN — CEFTRIAXONE 1000 MG: 1 INJECTION, POWDER, FOR SOLUTION INTRAMUSCULAR; INTRAVENOUS at 14:48

## 2021-06-04 RX ADMIN — KETOROLAC TROMETHAMINE 15 MG: 30 INJECTION, SOLUTION INTRAMUSCULAR at 13:53

## 2021-06-04 RX ADMIN — POTASSIUM CHLORIDE 40 MEQ: 1500 TABLET, EXTENDED RELEASE ORAL at 14:51

## 2021-06-04 RX ADMIN — SODIUM CHLORIDE 1000 ML: 0.9 INJECTION, SOLUTION INTRAVENOUS at 13:51

## 2021-06-04 RX ADMIN — ONDANSETRON 4 MG: 2 INJECTION INTRAMUSCULAR; INTRAVENOUS at 13:52

## 2021-06-04 NOTE — DISCHARGE INSTRUCTIONS
Please follow-up with family doctor  Use ibuprofen for pain as needed  Take your antibiotics    Return to ER if having fevers, vomiting, worsening pain or feel worse overall

## 2021-06-04 NOTE — ED NOTES
Takes antihypertensives, did not take today  Last tylenol 1200 today  No ana Forrest, RN  06/04/21 5241

## 2021-06-04 NOTE — ED PROVIDER NOTES
History  Chief Complaint   Patient presents with    Flank Swelling     C/o lower back pain starting three days  Started on right, moved to whole back  Admits fevers and burning with urination  History provided by:  Patient   used: No      Patient is a 58-year-old female presenting to emergency department with 3 days of lower back pain on the right  Has had nausea, chills and dysuria for a week  No vomiting  No abdominal pain  No flank pain  History of UTI  Dysuria feels similar  No chest pain or shortness of breath  No history of diabetes  Smoker  No drugs alcohol  MDM early kidney infection, will check urine, check kidney function, Zofran, Toradol    Urine concerning for infection  Given antibiotics  Will treat for pyelo  Outpatient follow-up  Return precautions explained  Patient verbalized understanding of need to follow-up and also to return if worse  Prior to Admission Medications   Prescriptions Last Dose Informant Patient Reported? Taking? amLODIPine-benazepril (LOTREL 5-20) 5-20 MG per capsule  Self No No   Sig: Take 1 capsule by mouth daily Prescribed, however, currently not taking due to insurance issue   escitalopram (LEXAPRO) 10 mg tablet  Self Yes No   Sig: Take 5 mg by mouth daily   levothyroxine 150 mcg tablet  Self Yes No   varenicline (CHANTIX CONTINUING MONTH LINDSEY) 1 mg tablet  Self Yes No   Sig: TAKE 1 TABLET TWICE DAILY        Facility-Administered Medications: None       Past Medical History:   Diagnosis Date    Abnormal Pap smear of cervix     [facility] [address] [facility] )     thyroid removed    HPV (human papilloma virus) infection     Hyperlipidemia     Hypertension     Hypokalemia 10/25/2017    Postoperative hypothyroidism 10/25/2017    Thyroid cancer ([facility] [address] [facility] )     Tobacco abuse 10/25/2017       Past Surgical History:   Procedure Laterality Date    COLPOSCOPY W/ BIOPSY / CURETTAGE      TOTAL THYROIDECTOMY         Family History   Problem Relation Age of Onset    Uterine cancer Mother     Hypothyroidism Father     Lymphoma Father     Hypothyroidism Sister     Hypothyroidism Paternal Grandmother     Hypothyroidism Paternal Aunt      I have reviewed and agree with the history as documented  E-Cigarette/Vaping     E-Cigarette/Vaping Substances     Social History     Tobacco Use    Smoking status: Current Some Day Smoker     Packs/day: 0 25     Years: 15 00     Pack years: 3 75     Types: Cigarettes    Smokeless tobacco: Never Used   Substance Use Topics    Alcohol use: No    Drug use: No       Review of Systems   Constitutional: Positive for fever  Negative for chills and diaphoresis  HENT: Negative for congestion and sore throat  Respiratory: Negative for cough, shortness of breath, wheezing and stridor  Cardiovascular: Negative for chest pain, palpitations and leg swelling  Gastrointestinal: Positive for nausea  Negative for abdominal pain, blood in stool, diarrhea and vomiting  Genitourinary: Positive for dysuria  Negative for frequency and urgency  Musculoskeletal: Positive for back pain  Negative for neck pain and neck stiffness  Skin: Negative for pallor and rash  Neurological: Negative for dizziness, syncope, weakness, light-headedness and headaches  All other systems reviewed and are negative  Physical Exam  Physical Exam  Vitals signs reviewed  Constitutional:       Appearance: Normal appearance  She is well-developed  HENT:      Head: Normocephalic and atraumatic  Eyes:      Extraocular Movements: Extraocular movements intact  Pupils: Pupils are equal, round, and reactive to light  Neck:      Musculoskeletal: Normal range of motion and neck supple  Cardiovascular:      Rate and Rhythm: Normal rate and regular rhythm  Heart sounds: Normal heart sounds  Pulmonary:      Effort: Pulmonary effort is normal  No respiratory distress  Breath sounds: Normal breath sounds     Abdominal:      General: Bowel sounds are normal       Palpations: Abdomen is soft  Tenderness: There is no abdominal tenderness  Comments: Right CVA tenderness   Musculoskeletal: Normal range of motion  General: No swelling or tenderness  Skin:     General: Skin is warm and dry  Capillary Refill: Capillary refill takes less than 2 seconds  Neurological:      General: No focal deficit present  Mental Status: She is alert and oriented to person, place, and time  Vital Signs  ED Triage Vitals [06/04/21 1310]   Temperature Pulse Respirations Blood Pressure SpO2   98 8 °F (37 1 °C) 94 18 (!) 185/119 97 %      Temp Source Heart Rate Source Patient Position - Orthostatic VS BP Location FiO2 (%)   Oral Monitor Sitting Left arm --      Pain Score       8           Vitals:    06/04/21 1310 06/04/21 1526   BP: (!) 185/119 (!) 157/101   Pulse: 94 81   Patient Position - Orthostatic VS: Sitting Lying         Visual Acuity      ED Medications  Medications   sodium chloride 0 9 % bolus 1,000 mL (0 mL Intravenous Stopped 6/4/21 1549)   ondansetron (ZOFRAN) injection 4 mg (4 mg Intravenous Given 6/4/21 1352)   ketorolac (TORADOL) injection 15 mg (15 mg Intravenous Given 6/4/21 1353)   ceftriaxone (ROCEPHIN) 1 g/50 mL in dextrose IVPB (0 mg Intravenous Stopped 6/4/21 1549)   potassium chloride (K-DUR,KLOR-CON) CR tablet 40 mEq (40 mEq Oral Given 6/4/21 1451)       Diagnostic Studies  Results Reviewed     Procedure Component Value Units Date/Time    Lactic acid [[id number]]  (Normal) Collected: 06/04/21 1330    Lab Status: Final result Specimen: Blood from Arm, Left Updated: 06/04/21 1417     LACTIC ACID 1 9 mmol/L     Narrative:      Result may be elevated if tourniquet was used during collection      Urine Microscopic [[id number]]  (Abnormal) Collected: 06/04/21 1338    Lab Status: Final result Specimen: Urine, Clean Catch Updated: 06/04/21 1413     RBC, UA None Seen /hpf      WBC, UA 10-20 /hpf      Epithelial Cells Occasional /hpf      Bacteria, UA Occasional /hpf     Urine culture [[id number]] Collected: 06/04/21 1338    Lab Status:  In process Specimen: Urine, Clean Catch Updated: 06/04/21 1413    Comprehensive metabolic panel [[id number]]  (Abnormal) Collected: 06/04/21 1330    Lab Status: Final result Specimen: Blood from Arm, Left Updated: 06/04/21 1409     Sodium 142 mmol/L      Potassium 3 0 mmol/L      Chloride 101 mmol/L      CO2 28 mmol/L      ANION GAP 13 mmol/L      BUN 10 mg/dL      Creatinine 0 98 mg/dL      Glucose 110 mg/dL      Calcium 8 6 mg/dL      AST 26 U/L      ALT 50 U/L      Alkaline Phosphatase 76 U/L      Total Protein 8 2 g/dL      Albumin 3 9 g/dL      Total Bilirubin 0 51 mg/dL      eGFR 72 ml/min/1 73sq m     Narrative:      National Kidney Disease Foundation guidelines for Chronic Kidney Disease (CKD):     Stage 1 with normal or high GFR (GFR > 90 mL/min/1 73 square meters)    Stage 2 Mild CKD (GFR = 60-89 mL/min/1 73 square meters)    Stage 3A Moderate CKD (GFR = 45-59 mL/min/1 73 square meters)    Stage 3B Moderate CKD (GFR = 30-44 mL/min/1 73 square meters)    Stage 4 Severe CKD (GFR = 15-29 mL/min/1 73 square meters)    Stage 5 End Stage CKD (GFR <15 mL/min/1 73 square meters)  Note: GFR calculation is accurate only with a steady state creatinine    POCT pregnancy, urine [[id number]]  (Normal) Resulted: 06/04/21 1340    Lab Status: Final result Updated: 06/04/21 1340     EXT PREG TEST UR (Ref: Negative) Negative     Control Valid    CBC and differential [[id number]] Collected: 06/04/21 1330    Lab Status: Final result Specimen: Blood from Arm, Left Updated: 06/04/21 1340     WBC 9 40 Thousand/uL      RBC 4 98 Million/uL      Hemoglobin 14 5 g/dL      Hematocrit 43 4 %      MCV 87 fL      MCH 29 1 pg      MCHC 33 4 g/dL      RDW 13 3 %      MPV 11 2 fL      Platelets 580 Thousands/uL      nRBC 0 /100 WBCs      Neutrophils Relative 58 %      Immat GRANS % 1 %      Lymphocytes Relative 28 % Monocytes Relative 9 %      Eosinophils Relative 3 %      Basophils Relative 1 %      Neutrophils Absolute 5 60 Thousands/µL      Immature Grans Absolute 0 06 Thousand/uL      Lymphocytes Absolute 2 60 Thousands/µL      Monocytes Absolute 0 80 Thousand/µL      Eosinophils Absolute 0 28 Thousand/µL      Basophils Absolute 0 06 Thousands/µL     Urine Macroscopic, POC [[id number]]  (Abnormal) Collected: 06/04/21 1338    Lab Status: Final result Specimen: Urine Updated: 06/04/21 1340     Color, UA Yellow     Clarity, UA Cloudy     pH, UA 7 0     Leukocytes, UA Large     Nitrite, UA Negative     Protein, UA Trace mg/dl      Glucose, UA Negative mg/dl      Ketones, UA Negative mg/dl      Urobilinogen, UA 0 2 E U /dl      Bilirubin, UA Negative     Blood, UA Small     Specific [place], UA <=1 005    Narrative:      CLINITEK RESULT                 No orders to display              Procedures  Procedures         ED Course                                           MDM    Disposition  Final diagnoses:   Pyelonephritis     Time reflects when diagnosis was documented in both MDM as applicable and the Disposition within this note     Time User Action Codes Description Comment    6/4/2021  3:32 PM Franky Hernandez Add [N12] Pyelonephritis       ED Disposition     ED Disposition Condition Date/Time Comment    Discharge Stable Fri Jun 4, 2021  3:32 PM Anneliese Fought discharge to home/self care              Follow-up Information     Follow up With Specialties Details Why Contact Info Additional Information    Ancelmo Griffith, DO Family Medicine In 2 days Re-evaluation [facility] [address] [place] [address] 86 Emergency Department Emergency Medicine  As needed, If symptoms worsen [address] Emergency Department, [address], [place], [place], [address]          Discharge Medication List as of 6/4/2021  3:34 PM      START taking these medications    Details   cephalexin (KEFLEX) 500 mg capsule Take 1 capsule (500 mg total) by mouth every 6 (six) hours for 10 days, Starting Fri 6/4/2021, Until Mon 6/14/2021, Normal         CONTINUE these medications which have NOT CHANGED    Details   amLODIPine-benazepril (LOTREL 5-20) 5-20 MG per capsule Take 1 capsule by mouth daily Prescribed, however, currently not taking due to insurance issue, Starting Wed 10/25/2017, Print      escitalopram (LEXAPRO) 10 mg tablet Take 5 mg by mouth daily, Historical Med      levothyroxine 150 mcg tablet Starting Fri 10/5/2018, Historical Med      varenicline (CHANTIX CONTINUING MONTH PAK) 1 mg tablet TAKE 1 TABLET TWICE DAILY  , Historical Med           No discharge procedures on file      PDMP Review     None          ED Provider  Electronically Signed by           Jesus Sprague MD  06/04/21 4308

## 2021-06-05 LAB — BACTERIA UR CULT: NORMAL

## 2021-07-29 ENCOUNTER — HOSPITAL ENCOUNTER (OUTPATIENT)
Facility: HOSPITAL | Age: 41
Setting detail: OBSERVATION
Discharge: HOME | End: 2021-07-30
Attending: EMERGENCY MEDICINE | Admitting: GENERAL PRACTICE
Payer: COMMERCIAL

## 2021-07-29 ENCOUNTER — APPOINTMENT (EMERGENCY)
Dept: RADIOLOGY | Facility: HOSPITAL | Age: 41
End: 2021-07-29
Payer: COMMERCIAL

## 2021-07-29 DIAGNOSIS — E87.6 HYPOKALEMIA: ICD-10-CM

## 2021-07-29 DIAGNOSIS — R07.9 CHEST PAIN: Primary | ICD-10-CM

## 2021-07-29 DIAGNOSIS — R94.31 PROLONGED Q-T INTERVAL ON ECG: ICD-10-CM

## 2021-07-29 DIAGNOSIS — R94.31 PROLONGED QT INTERVAL: ICD-10-CM

## 2021-07-29 PROBLEM — R11.2 NAUSEA & VOMITING: Status: ACTIVE | Noted: 2021-07-29

## 2021-07-29 LAB
ALBUMIN SERPL BCP-MCNC: 3.8 G/DL (ref 3.5–5)
ALP SERPL-CCNC: 77 U/L (ref 46–116)
ALT SERPL W P-5'-P-CCNC: 78 U/L (ref 12–78)
ANION GAP SERPL CALCULATED.3IONS-SCNC: 10 MMOL/L (ref 4–13)
AST SERPL W P-5'-P-CCNC: 45 U/L (ref 5–45)
ATRIAL RATE: 110 BPM
BACTERIA UR QL AUTO: NORMAL /HPF
BASOPHILS # BLD AUTO: 0.08 THOUSANDS/ΜL (ref 0–0.1)
BASOPHILS NFR BLD AUTO: 1 % (ref 0–1)
BILIRUB SERPL-MCNC: 0.56 MG/DL (ref 0.2–1)
BILIRUB UR QL STRIP: NEGATIVE
BUN SERPL-MCNC: 9 MG/DL (ref 5–25)
CALCIUM SERPL-MCNC: 8.8 MG/DL (ref 8.3–10.1)
CHLORIDE SERPL-SCNC: 103 MMOL/L (ref 100–108)
CLARITY UR: CLEAR
CO2 SERPL-SCNC: 25 MMOL/L (ref 21–32)
COLOR UR: YELLOW
CREAT SERPL-MCNC: 0.83 MG/DL (ref 0.6–1.3)
D DIMER PPP FEU-MCNC: 0.31 UG/ML FEU
EOSINOPHIL # BLD AUTO: 0.26 THOUSAND/ΜL (ref 0–0.61)
EOSINOPHIL NFR BLD AUTO: 3 % (ref 0–6)
ERYTHROCYTE [DISTWIDTH] IN BLOOD BY AUTOMATED COUNT: 13.2 % (ref 11.6–15.1)
GFR SERPL CREATININE-BSD FRML MDRD: 87 ML/MIN/1.73SQ M
GLUCOSE SERPL-MCNC: 102 MG/DL (ref 65–140)
GLUCOSE UR STRIP-MCNC: NEGATIVE MG/DL
HCT VFR BLD AUTO: 43.7 % (ref 34.8–46.1)
HGB BLD-MCNC: 14.7 G/DL (ref 11.5–15.4)
HGB UR QL STRIP.AUTO: ABNORMAL
HYALINE CASTS #/AREA URNS LPF: NORMAL /LPF
IMM GRANULOCYTES # BLD AUTO: 0.04 THOUSAND/UL (ref 0–0.2)
IMM GRANULOCYTES NFR BLD AUTO: 0 % (ref 0–2)
KETONES UR STRIP-MCNC: NEGATIVE MG/DL
LEUKOCYTE ESTERASE UR QL STRIP: NEGATIVE
LYMPHOCYTES # BLD AUTO: 2.35 THOUSANDS/ΜL (ref 0.6–4.47)
LYMPHOCYTES NFR BLD AUTO: 26 % (ref 14–44)
MAGNESIUM SERPL-MCNC: 2.1 MG/DL (ref 1.6–2.6)
MCH RBC QN AUTO: 29.7 PG (ref 26.8–34.3)
MCHC RBC AUTO-ENTMCNC: 33.6 G/DL (ref 31.4–37.4)
MCV RBC AUTO: 88 FL (ref 82–98)
MONOCYTES # BLD AUTO: 0.89 THOUSAND/ΜL (ref 0.17–1.22)
MONOCYTES NFR BLD AUTO: 10 % (ref 4–12)
NEUTROPHILS # BLD AUTO: 5.61 THOUSANDS/ΜL (ref 1.85–7.62)
NEUTS SEG NFR BLD AUTO: 60 % (ref 43–75)
NITRITE UR QL STRIP: NEGATIVE
NON-SQ EPI CELLS URNS QL MICRO: NORMAL /HPF
NRBC BLD AUTO-RTO: 0 /100 WBCS
PH UR STRIP.AUTO: 7 [PH] (ref 4.5–8)
PLATELET # BLD AUTO: 319 THOUSANDS/UL (ref 149–390)
PMV BLD AUTO: 11.6 FL (ref 8.9–12.7)
POTASSIUM SERPL-SCNC: 3.2 MMOL/L (ref 3.5–5.3)
PROT SERPL-MCNC: 8.1 G/DL (ref 6.4–8.2)
PROT UR STRIP-MCNC: NEGATIVE MG/DL
QRS AXIS: 53 DEGREES
QRSD INTERVAL: 82 MS
QT INTERVAL: 476 MS
QTC INTERVAL: 644 MS
RBC # BLD AUTO: 4.95 MILLION/UL (ref 3.81–5.12)
RBC #/AREA URNS AUTO: NORMAL /HPF
SARS-COV-2 RNA RESP QL NAA+PROBE: NEGATIVE
SODIUM SERPL-SCNC: 138 MMOL/L (ref 136–145)
SP GR UR STRIP.AUTO: 1.01 (ref 1–1.03)
T WAVE AXIS: 60 DEGREES
T4 FREE SERPL-MCNC: 1.8 NG/DL (ref 0.76–1.46)
TROPONIN I SERPL-MCNC: <0.02 NG/ML
TROPONIN I SERPL-MCNC: <0.02 NG/ML
TSH SERPL DL<=0.05 MIU/L-ACNC: 0.2 UIU/ML (ref 0.36–3.74)
UROBILINOGEN UR QL STRIP.AUTO: 0.2 E.U./DL
VENTRICULAR RATE: 110 BPM
WBC # BLD AUTO: 9.23 THOUSAND/UL (ref 4.31–10.16)
WBC #/AREA URNS AUTO: NORMAL /HPF

## 2021-07-29 PROCEDURE — U0003 INFECTIOUS AGENT DETECTION BY NUCLEIC ACID (DNA OR RNA); SEVERE ACUTE RESPIRATORY SYNDROME CORONAVIRUS 2 (SARS-COV-2) (CORONAVIRUS DISEASE [COVID-19]), AMPLIFIED PROBE TECHNIQUE, MAKING USE OF HIGH THROUGHPUT TECHNOLOGIES AS DESCRIBED BY CMS-2020-01-R: HCPCS | Performed by: EMERGENCY MEDICINE

## 2021-07-29 PROCEDURE — 93005 ELECTROCARDIOGRAM TRACING: CPT

## 2021-07-29 PROCEDURE — 81001 URINALYSIS AUTO W/SCOPE: CPT

## 2021-07-29 PROCEDURE — 85025 COMPLETE CBC W/AUTO DIFF WBC: CPT | Performed by: EMERGENCY MEDICINE

## 2021-07-29 PROCEDURE — 80053 COMPREHEN METABOLIC PANEL: CPT | Performed by: EMERGENCY MEDICINE

## 2021-07-29 PROCEDURE — 99285 EMERGENCY DEPT VISIT HI MDM: CPT

## 2021-07-29 PROCEDURE — 85379 FIBRIN DEGRADATION QUANT: CPT | Performed by: GENERAL PRACTICE

## 2021-07-29 PROCEDURE — U0005 INFEC AGEN DETEC AMPLI PROBE: HCPCS | Performed by: EMERGENCY MEDICINE

## 2021-07-29 PROCEDURE — 84439 ASSAY OF FREE THYROXINE: CPT | Performed by: EMERGENCY MEDICINE

## 2021-07-29 PROCEDURE — 36415 COLL VENOUS BLD VENIPUNCTURE: CPT

## 2021-07-29 PROCEDURE — 96360 HYDRATION IV INFUSION INIT: CPT

## 2021-07-29 PROCEDURE — 84443 ASSAY THYROID STIM HORMONE: CPT | Performed by: EMERGENCY MEDICINE

## 2021-07-29 PROCEDURE — 84484 ASSAY OF TROPONIN QUANT: CPT | Performed by: EMERGENCY MEDICINE

## 2021-07-29 PROCEDURE — 99285 EMERGENCY DEPT VISIT HI MDM: CPT | Performed by: EMERGENCY MEDICINE

## 2021-07-29 PROCEDURE — 93010 ELECTROCARDIOGRAM REPORT: CPT | Performed by: INTERNAL MEDICINE

## 2021-07-29 PROCEDURE — 71045 X-RAY EXAM CHEST 1 VIEW: CPT

## 2021-07-29 PROCEDURE — 83735 ASSAY OF MAGNESIUM: CPT

## 2021-07-29 PROCEDURE — 99219 PR INITIAL OBSERVATION CARE/DAY 50 MINUTES: CPT | Performed by: GENERAL PRACTICE

## 2021-07-29 PROCEDURE — 84484 ASSAY OF TROPONIN QUANT: CPT | Performed by: GENERAL PRACTICE

## 2021-07-29 RX ORDER — ATORVASTATIN CALCIUM 80 MG/1
80 TABLET, FILM COATED ORAL DAILY
COMMUNITY

## 2021-07-29 RX ORDER — ROPINIROLE 2 MG/1
4 TABLET, FILM COATED ORAL
Status: DISCONTINUED | OUTPATIENT
Start: 2021-07-29 | End: 2021-07-30 | Stop reason: HOSPADM

## 2021-07-29 RX ORDER — ROPINIROLE 4 MG/1
4 TABLET, FILM COATED ORAL
COMMUNITY

## 2021-07-29 RX ORDER — ESCITALOPRAM OXALATE 10 MG/1
10 TABLET ORAL DAILY
Status: DISCONTINUED | OUTPATIENT
Start: 2021-07-30 | End: 2021-07-30 | Stop reason: HOSPADM

## 2021-07-29 RX ORDER — VARENICLINE TARTRATE 1 MG/1
1 TABLET, FILM COATED ORAL 2 TIMES DAILY
Status: DISCONTINUED | OUTPATIENT
Start: 2021-07-29 | End: 2021-07-30 | Stop reason: HOSPADM

## 2021-07-29 RX ORDER — POTASSIUM CHLORIDE 20 MEQ/1
40 TABLET, EXTENDED RELEASE ORAL ONCE
Status: COMPLETED | OUTPATIENT
Start: 2021-07-29 | End: 2021-07-29

## 2021-07-29 RX ORDER — LISINOPRIL 10 MG/1
20 TABLET ORAL DAILY
Status: DISCONTINUED | OUTPATIENT
Start: 2021-07-30 | End: 2021-07-30 | Stop reason: HOSPADM

## 2021-07-29 RX ORDER — AMLODIPINE BESYLATE 5 MG/1
5 TABLET ORAL DAILY
Status: DISCONTINUED | OUTPATIENT
Start: 2021-07-30 | End: 2021-07-30 | Stop reason: HOSPADM

## 2021-07-29 RX ORDER — PROCHLORPERAZINE MALEATE 5 MG/1
5 TABLET ORAL EVERY 6 HOURS PRN
Status: DISCONTINUED | OUTPATIENT
Start: 2021-07-29 | End: 2021-07-30 | Stop reason: HOSPADM

## 2021-07-29 RX ORDER — ATORVASTATIN CALCIUM 80 MG/1
80 TABLET, FILM COATED ORAL
Status: DISCONTINUED | OUTPATIENT
Start: 2021-07-30 | End: 2021-07-30 | Stop reason: HOSPADM

## 2021-07-29 RX ORDER — LEVOTHYROXINE SODIUM 0.1 MG/1
200 TABLET ORAL
Status: DISCONTINUED | OUTPATIENT
Start: 2021-07-30 | End: 2021-07-30 | Stop reason: HOSPADM

## 2021-07-29 RX ORDER — ACETAMINOPHEN 325 MG/1
650 TABLET ORAL EVERY 6 HOURS PRN
Status: DISCONTINUED | OUTPATIENT
Start: 2021-07-29 | End: 2021-07-30 | Stop reason: HOSPADM

## 2021-07-29 RX ADMIN — SODIUM CHLORIDE 1000 ML: 0.9 INJECTION, SOLUTION INTRAVENOUS at 15:58

## 2021-07-29 RX ADMIN — POTASSIUM CHLORIDE 40 MEQ: 1500 TABLET, EXTENDED RELEASE ORAL at 21:36

## 2021-07-29 RX ADMIN — ROPINIROLE HYDROCHLORIDE 4 MG: 2 TABLET, FILM COATED ORAL at 21:36

## 2021-07-29 RX ADMIN — POTASSIUM CHLORIDE 40 MEQ: 1500 TABLET, EXTENDED RELEASE ORAL at 17:31

## 2021-07-29 NOTE — ASSESSMENT & PLAN NOTE
· MIRI 1  · EKG nonspecific changes  · Trop neg x 1 - Trend  · DD WNL  · Check A1C  · If trops neg would need Stress echo if not diabetic  Would get exercise nuclear if pt has DM

## 2021-07-29 NOTE — ED PROVIDER NOTES
History  Chief Complaint   Patient presents with    High Blood Pressure     Patient reports that her blood pressure was really high when she checked it at [facility] today; states that she has been having nausea and a headache; also reports some blood in stool for about two days     Cory Atwood is an 42y o  year old female with PMHx significant for HTN, hypothyroidism, who presents to the ED today concern about HTN  States that her doctor told her to come in because her BP at [facility] 180/130  Checked it while getting covid shot  Has had nausea, fatigue, lightheaded for one day  States that her chest has felt heavy for the last few days that is worse with exertion and does not occur with rest   Denies any pain  Has had bloody bowel movements for the last 3 days that are non-painful  Has a hx of external hemorrhoids  The patient denies fevers, chills, headaches, syncope, nausea, vomiting, vision changes, hearing changes, shortness of breath, cough, abdominal pain, changes in usual bowel movements, changes with urination, back pain, numbness or tingling, pain anywhere else in body  ROS otherwise negative  The patient has no sick contacts, recent travel history, or immunocompromise  Hx of cigarette smoking but quit a few months ago  Father had CABG at age 55  History provided by:  Medical records and patient   used: No    Chest Pain      Prior to Admission Medications   Prescriptions Last Dose Informant Patient Reported? Taking?    Levothyroxine Sodium 200 MCG CAPS  Self Yes No   Sig: Take by mouth daily    amLODIPine-benazepril (LOTREL 5-20) 5-20 MG per capsule  Self No No   Sig: Take 1 capsule by mouth daily Prescribed, however, currently not taking due to insurance issue   atorvastatin (LIPITOR) 80 mg tablet   Yes Yes   Sig: Take 80 mg by mouth daily   escitalopram (LEXAPRO) 10 mg tablet  Self Yes No   Sig: Take 20 mg by mouth daily    rOPINIRole (REQUIP) 4 mg tablet   Yes Yes Sig: Take 4 mg by mouth daily at bedtime   varenicline (CHANTIX CONTINUING MONTH PAK) 1 mg tablet  Self Yes No   Sig: TAKE 1 TABLET TWICE DAILY  Facility-Administered Medications: None       Past Medical History:   Diagnosis Date    Abnormal Pap smear of cervix     Cancer ([facility] )     thyroid removed    HPV (human papilloma virus) infection     Hyperlipidemia     Hypertension     Hypokalemia 10/25/2017    Postoperative hypothyroidism 10/25/2017    Thyroid cancer ([facility] )     Tobacco abuse 10/25/2017       Past Surgical History:   Procedure Laterality Date    COLPOSCOPY W/ BIOPSY / CURETTAGE      TOTAL THYROIDECTOMY         Family History   Problem Relation Age of Onset    Uterine cancer Mother     Hypothyroidism Father     Lymphoma Father     Hypothyroidism Sister     Hypothyroidism Paternal Grandmother     Hypothyroidism Paternal Aunt      I have reviewed and agree with the history as documented  E-Cigarette/Vaping    E-Cigarette Use Never User      E-Cigarette/Vaping Substances     Social History     Tobacco Use    Smoking status: Current Some Day Smoker     Packs/day: 0 25     Years: 15 00     Pack years: 3 75     Types: Cigarettes    Smokeless tobacco: Never Used   Vaping Use    Vaping Use: Never used   Substance Use Topics    Alcohol use: No    Drug use: No        Review of Systems   Reason unable to perform ROS: see above  Cardiovascular: Positive for chest pain         Physical Exam  ED Triage Vitals [07/29/21 1451]   Temperature Pulse Respirations Blood Pressure SpO2   98 4 °F (36 9 °C) (!) 125 18 (!) 180/119 97 %      Temp Source Heart Rate Source Patient Position - Orthostatic VS BP Location FiO2 (%)   Oral Monitor Sitting Right arm --      Pain Score       5             Orthostatic Vital Signs  Vitals:    07/29/21 1515 07/29/21 1530 07/29/21 1600 07/29/21 1700   BP:  (!) 145/109 (!) 149/101 (!) 145/105   Pulse: 99 104 100 95   Patient Position - Orthostatic VS: Physical Exam  Vitals and nursing note reviewed  Constitutional:       General: She is not in acute distress  Appearance: She is well-developed  HENT:      Head: Normocephalic and atraumatic  Eyes:      Conjunctiva/sclera: Conjunctivae normal    Cardiovascular:      Rate and Rhythm: Regular rhythm  Tachycardia present  Heart sounds: No murmur heard  Pulmonary:      Effort: Pulmonary effort is normal  No respiratory distress  Breath sounds: Normal breath sounds  Abdominal:      Palpations: Abdomen is soft  Tenderness: There is no abdominal tenderness  Musculoskeletal:         General: No tenderness  Cervical back: Neck supple  Right lower leg: No edema  Left lower leg: No edema  Skin:     General: Skin is warm and dry  Neurological:      Mental Status: She is alert           ED Medications  Medications   sodium chloride 0 9 % bolus 1,000 mL (0 mL Intravenous Stopped 7/29/21 1726)   potassium chloride (K-DUR,KLOR-CON) CR tablet 40 mEq (40 mEq Oral Given 7/29/21 1731)       Diagnostic Studies  Results Reviewed     Procedure Component Value Units Date/Time    D-dimer, quantitative [[id number]]  (Normal) Collected: 07/29/21 1520    Lab Status: Final result Specimen: Blood from Arm, Left Updated: 07/29/21 1819     D-Dimer, Quant 0 31 ug/ml FEU     Novel Coronavirus (Covid-19),PCR [facility] [[id number]]  (Normal) Collected: 07/29/21 1556    Lab Status: Final result Specimen: Nares from Nasopharyngeal Swab Updated: 07/29/21 1807     SARS-CoV-2 Negative    Narrative:           T4, free [[id number]]  (Abnormal) Collected: 07/29/21 1556    Lab Status: Final result Specimen: Blood from Arm, Left Updated: 07/29/21 1658     Free T4 1 80 ng/dL     Magnesium [[id number]]  (Normal) Collected: 07/29/21 1556    Lab Status: Final result Specimen: Blood from Arm, Left Updated: 07/29/21 1658     Magnesium 2 1 mg/dL     TSH, 3rd generation with Free T4 reflex [[id number]]  (Abnormal) Collected: 07/29/21 1556    Lab Status: Final result Specimen: Blood from Arm, Left Updated: 07/29/21 1631     TSH 3RD GENERATON 0 202 uIU/mL     Narrative:      Patients undergoing fluorescein dye angiography may retain small amounts of fluorescein in the body for 48-72 hours post procedure  Samples containing fluorescein can produce falsely depressed TSH values  If the patient had this procedure,a specimen should be resubmitted post fluorescein clearance        Urine Microscopic [[id number]]  (Normal) Collected: 07/29/21 1526    Lab Status: Final result Specimen: Urine, Clean Catch Updated: 07/29/21 1617     RBC, UA 2-4 /hpf      WBC, UA 2-4 /hpf      Epithelial Cells Occasional /hpf      Bacteria, UA None Seen /hpf      Hyaline Casts, UA None Seen /lpf     Troponin I [[id number]]  (Normal) Collected: 07/29/21 1520    Lab Status: Final result Specimen: Blood from Arm, Left Updated: 07/29/21 1551     Troponin I <0 02 ng/mL     Comprehensive metabolic panel [[id number]]  (Abnormal) Collected: 07/29/21 1520    Lab Status: Final result Specimen: Blood from Arm, Left Updated: 07/29/21 1550     Sodium 138 mmol/L      Potassium 3 2 mmol/L      Chloride 103 mmol/L      CO2 25 mmol/L      ANION GAP 10 mmol/L      BUN 9 mg/dL      Creatinine 0 83 mg/dL      Glucose 102 mg/dL      Calcium 8 8 mg/dL      AST 45 U/L      ALT 78 U/L      Alkaline Phosphatase 77 U/L      Total Protein 8 1 g/dL      Albumin 3 8 g/dL      Total Bilirubin 0 56 mg/dL      eGFR 87 ml/min/1 73sq m     Narrative:      Jude guidelines for Chronic Kidney Disease (CKD):     Stage 1 with normal or high GFR (GFR > 90 mL/min/1 73 square meters)    Stage 2 Mild CKD (GFR = 60-89 mL/min/1 73 square meters)    Stage 3A Moderate CKD (GFR = 45-59 mL/min/1 73 square meters)    Stage 3B Moderate CKD (GFR = 30-44 mL/min/1 73 square meters)    Stage 4 Severe CKD (GFR = 15-29 mL/min/1 73 square meters)    Stage 5 End Stage CKD (GFR <15 mL/min/1 73 square meters)  Note: GFR calculation is accurate only with a steady state creatinine    CBC and differential [[id number]] Collected: 07/29/21 1520    Lab Status: Final result Specimen: Blood from Arm, Left Updated: 07/29/21 1530     WBC 9 23 Thousand/uL      RBC 4 95 Million/uL      Hemoglobin 14 7 g/dL      Hematocrit 43 7 %      MCV 88 fL      MCH 29 7 pg      MCHC 33 6 g/dL      RDW 13 2 %      MPV 11 6 fL      Platelets 992 Thousands/uL      nRBC 0 /100 WBCs      Neutrophils Relative 60 %      Immat GRANS % 0 %      Lymphocytes Relative 26 %      Monocytes Relative 10 %      Eosinophils Relative 3 %      Basophils Relative 1 %      Neutrophils Absolute 5 61 Thousands/µL      Immature Grans Absolute 0 04 Thousand/uL      Lymphocytes Absolute 2 35 Thousands/µL      Monocytes Absolute 0 89 Thousand/µL      Eosinophils Absolute 0 26 Thousand/µL      Basophils Absolute 0 08 Thousands/µL     Urine Macroscopic, POC [[id number]]  (Abnormal) Collected: 07/29/21 1526    Lab Status: Final result Specimen: Urine Updated: 07/29/21 1527     Color, UA Yellow     Clarity, UA Clear     pH, UA 7 0     Leukocytes, UA Negative     Nitrite, UA Negative     Protein, UA Negative mg/dl      Glucose, UA Negative mg/dl      Ketones, UA Negative mg/dl      Urobilinogen, UA 0 2 E U /dl      Bilirubin, UA Negative     Blood, UA Trace     Specific [place], UA 1 010    Narrative:      CLINITEK RESULT                 XR chest 1 view portable   ED Interpretation by Justin Luis DO (07/29 1711)   No effusion, no focal infiltrate            Procedures  Procedures      ED Course  ED Course as of Jul 29 1820   Thu Jul 29, 2021   1607 Procedure Note: EKG  Date/Time: 07/29/21 4:08 PM   Interpreted by: Justin Luis DO  Indications / Diagnosis: CP  ECG reviewed by me, the ED Provider: yes   The EKG demonstrates:  Rhythm: sinus, rate 110  Intervals: normal intervals  Axis: normal axis  Qtc 644            1619 Bacteria, UA: None Seen 1620 Epithelial Cells: Occasional   1634 TSH 3RD GENERATON(!): 0 202   1653 TSH 3RD GENERATON(!): 0 202   1701 Free T4(!): 1 80                             SBIRT 20yo+      Most Recent Value   SBIRT (22 yo +)   In order to provide better care to our patients, we are screening all of our patients for alcohol and drug use  Would it be okay to ask you these screening questions? No Filed at: 07/29/2021 1638        MIRI Risk Score      Most Recent Value   Age >= 72  0 Filed at: 07/29/2021 1800   Known CAD (stenosis >= 50%)  0 Filed at: 07/29/2021 1800   Recent (<=24 hrs) Service Angina  0 Filed at: 07/29/2021 1800   ST Deviation >= 0 5 mm  0 Filed at: 07/29/2021 1800   3+ CAD Risk Factors (FHx, HTN, HLP, DM, Smoker)  1 Filed at: 07/29/2021 1800   Aspirin Use Past 7 Days  0 Filed at: 07/29/2021 1800   Elevated Cardiac Markers  0 Filed at: 07/29/2021 1800   MIRI Risk Score (Calculated)  1 Filed at: 07/29/2021 1800              MDM  Number of Diagnoses or Management Options  Diagnosis management comments: The patient denies association with alcohol use, vomiting, eating, position, or pain radiating to the neck, arms, abdomen, or back  Pulses in extremities are palpable and symmetric  Breath sounds are present bilaterally and the patient is not respiratory distress  No dyspnea, syncope, hypoxia, unilateral leg swelling or tenderness, history of blood clots        Will check CBC, chemistry, tsh with reflex to t4, troponin, CXR           Amount and/or Complexity of Data Reviewed  Clinical lab tests: ordered and reviewed  Tests in the radiology section of CPT®: reviewed and ordered  Tests in the medicine section of CPT®: ordered and reviewed  Review and summarize past medical records: yes  Discuss the patient with other providers: yes    Risk of Complications, Morbidity, and/or Mortality  Presenting problems: low  Diagnostic procedures: low  Management options: low    Patient Progress  Patient progress: stable      Disposition  Final diagnoses:   Chest pain   Prolonged QT interval     Time reflects when diagnosis was documented in both MDM as applicable and the Disposition within this note     Time User Action Codes Description Comment    7/29/2021  5:17 PM Tisha Huggins [R07 9] Chest pain     7/29/2021  5:17 PM Tisha Barry Add [R94 31] Prolonged QT interval       ED Disposition     ED Disposition Condition Date/Time Comment    Admit Stable Thu Jul 29, 2021  5:17 PM Case was discussed with Dr Bethea  and the patient's admission status was agreed to be Admission Status: observation status to the service jenniffer pierce  Follow-up Information    None         Patient's Medications   Discharge Prescriptions    No medications on file     No discharge procedures on file  PDMP Review     None           ED Provider  Attending physically available and evaluated Miri El I managed the patient along with the ED Attending      Electronically Signed by         Aster Muniz DO  07/29/21 0471

## 2021-07-29 NOTE — H&P
[address] 1980, 42 y o  female MRN: [id number]  Unit/Bed#: ED 22 Encounter: [id number]  Primary Care Provider: Katy Lawler DO   Date and time admitted to hospital: 7/29/2021  3:07 PM    * Chest pain  Assessment & Plan  · MIRI 1  · EKG nonspecific changes  · Trop neg x 1 - Trend  · DD WNL  · Check A1C  · If trops neg would need Stress echo if not diabetic  Would get exercise nuclear if pt has DM  Prolonged Q-T interval on ECG  Assessment & Plan  · Monitor  · Replete K  · Decrease Lexapro - would taper off    Nausea & vomiting  Assessment & Plan  · Compazine prn  · No zofran 2/2 prolonged QTc    Hypokalemia  Assessment & Plan  · Appears chronic  · Replete  · Will need to be d/c on Kdur due to prolonged QTc    Postoperative hypothyroidism  Assessment & Plan  · Pt had thyroidectomy due to cancer  · TSH supposed to be <0 1 - Synthroid recently increased    Hypertension  Assessment & Plan  · C/w Lotrel    Hyperlipidemia  Assessment & Plan  · Statin    VTE Pharmacologic Prophylaxis: VTE Score: 3 Moderate Risk (Score 3-4) - Pharmacological DVT Prophylaxis Ordered: enoxaparin (Lovenox)  Code Status: Full  Discussion with family: Updated  (sister) via phone  Anticipated Length of Stay: Patient will be admitted on an observation basis with an anticipated length of stay of less than 2 midnights secondary to need to r/o ACS andm onitor QTc  Total Time for Visit, including Counseling / Coordination of Care: 45 minutes Greater than 50% of this total time spent on direct patient counseling and coordination of care  Chief Complaint: nausea    History of Present Illness:  Leigh Ann Barros is a 42 y o  female with a PMH of thyroid ca s/p thyroidectomy who presents with chest ache when she woke up this AM   Ache mild and associated w/ nausea and 2 episodes of vomit  Never had episode like this before    Noted  and pt called PCP who rec she go to ER  Pt hungry at this time  Feeling better  Former smoker  Admits to HLD for which statin recently increased  Fam hx of CAD  Review of Systems:  Review of Systems   Constitutional: Negative  HENT: Negative  Respiratory: Negative  Cardiovascular: Positive for chest pain  Gastrointestinal: Positive for nausea and vomiting  Endocrine: Negative  Genitourinary: Negative  Musculoskeletal: Negative  Skin: Negative  Allergic/Immunologic: Negative  Neurological: Negative  Hematological: Negative  Psychiatric/Behavioral: Negative  Past Medical and Surgical History:   Past Medical History:   Diagnosis Date    Abnormal Pap smear of cervix     [facility] )     thyroid removed    HPV (human papilloma virus) infection     Hyperlipidemia     Hypertension     Hypokalemia 10/25/2017    Postoperative hypothyroidism 10/25/2017    Thyroid cancer ([place] [address] )     Tobacco abuse 10/25/2017       Past Surgical History:   Procedure Laterality Date    COLPOSCOPY W/ BIOPSY / CURETTAGE      TOTAL THYROIDECTOMY         Meds/Allergies:  Prior to Admission medications    Medication Sig Start Date End Date Taking? Authorizing Provider   atorvastatin (LIPITOR) 80 mg tablet Take 80 mg by mouth daily   Yes Historical Provider, MD   rOPINIRole (REQUIP) 4 mg tablet Take 4 mg by mouth daily at bedtime   Yes Historical Provider, MD   amLODIPine-benazepril (LOTREL 5-20) 5-20 MG per capsule Take 1 capsule by mouth daily Prescribed, however, currently not taking due to insurance issue 10/25/17   Jim Mckeon MD   escitalopram (LEXAPRO) 10 mg tablet Take 20 mg by mouth daily     Historical Provider, MD   Levothyroxine Sodium 200 MCG CAPS Take by mouth daily  10/5/18   Historical Provider, MD   varenicline (CHANTIX CONTINUING MONTH LINDSEY) 1 mg tablet TAKE 1 TABLET TWICE DAILY  10/31/17   Historical Provider, MD     I have reviewed home medications with patient personally      Allergies: No Known Allergies    Social History:  Marital Status: /Civil Union     Patient Pre-hospital Living Situation: Home  Patient Pre-hospital Level of Mobility: walks    Substance Use History:   Social History     Substance and Sexual Activity   Alcohol Use No     Social History     Tobacco Use   Smoking Status Current Some Day Smoker    Packs/day: 0 25    Years: 15 00    Pack years: 3 75    Types: Cigarettes   Smokeless Tobacco Never Used     Social History     Substance and Sexual Activity   Drug Use No       Family History:  Family History   Problem Relation Age of Onset    Uterine cancer Mother     Hypothyroidism Father     Lymphoma Father     Hypothyroidism Sister     Hypothyroidism Paternal Grandmother     Hypothyroidism Paternal Aunt        Physical Exam:     Vitals:   Blood Pressure: 155/96 (07/29/21 1849)  Pulse: 103 (07/29/21 1849)  Temperature: 98 5 °F (36 9 °C) (07/29/21 1849)  Temp Source: Oral (07/29/21 1849)  Respirations: 20 (07/29/21 1849)  Height: 5' 1" (154 9 cm) (07/29/21 1451)  Weight - Scale: 77 1 kg (170 lb) (07/29/21 1451)  SpO2: 95 % (07/29/21 1849)    Physical Exam  HENT:      Head: Normocephalic and atraumatic  Nose: Nose normal       Mouth/Throat:      Mouth: Mucous membranes are moist    Eyes:      Extraocular Movements: Extraocular movements intact  Conjunctiva/sclera: Conjunctivae normal    Cardiovascular:      Rate and Rhythm: Normal rate and regular rhythm  Pulmonary:      Effort: Pulmonary effort is normal       Breath sounds: Normal breath sounds  No wheezing or rales  Abdominal:      General: Bowel sounds are normal  There is no distension  Palpations: Abdomen is soft  Tenderness: There is no abdominal tenderness  Musculoskeletal:         General: Normal range of motion  Cervical back: Normal range of motion and neck supple  Right lower leg: No edema  Left lower leg: No edema  Skin:     General: Skin is warm and dry  Neurological:      Mental Status: She is alert and oriented to person, place, and time  Additional Data:     Lab Results:  Results from last 7 days   Lab Units 07/29/21  1520   WBC Thousand/uL 9 23   HEMOGLOBIN g/dL 14 7   HEMATOCRIT % 43 7   PLATELETS Thousands/uL 319   NEUTROS PCT % 60   LYMPHS PCT % 26   MONOS PCT % 10   EOS PCT % 3     Results from last 7 days   Lab Units 07/29/21  1520   SODIUM mmol/L 138   POTASSIUM mmol/L 3 2*   CHLORIDE mmol/L 103   CO2 mmol/L 25   BUN mg/dL 9   CREATININE mg/dL 0 83   ANION GAP mmol/L 10   CALCIUM mg/dL 8 8   ALBUMIN g/dL 3 8   TOTAL BILIRUBIN mg/dL 0 56   ALK PHOS U/L 77   ALT U/L 78   AST U/L 45   GLUCOSE RANDOM mg/dL 102                       Imaging: Reviewed radiology reports from this admission including: chest xray  XR chest 1 view portable   ED Interpretation by Stephania Tom DO (07/29 1711)   No effusion, no focal infiltrate      Final Result by Erin Nolan MD (07/29 1851)      No acute cardiopulmonary disease  Workstation performed: [id number]             EKG and Other Studies Reviewed on Admission:   · EKG: Sinus Tachycardia       ** Please Note: This note has been constructed using a voice recognition system   **

## 2021-07-29 NOTE — ED NOTES
CW2 does not have a tele box for pt at this time        Arthur Barger  07/29/21 3 Jennifer Farooq  07/29/21 1959

## 2021-07-30 VITALS
WEIGHT: 175 LBS | RESPIRATION RATE: 18 BRPM | TEMPERATURE: 98.9 F | HEIGHT: 61 IN | SYSTOLIC BLOOD PRESSURE: 140 MMHG | BODY MASS INDEX: 33.04 KG/M2 | HEART RATE: 111 BPM | OXYGEN SATURATION: 94 % | DIASTOLIC BLOOD PRESSURE: 95 MMHG

## 2021-07-30 LAB
ANION GAP SERPL CALCULATED.3IONS-SCNC: 3 MMOL/L (ref 4–13)
ATRIAL RATE: 92 BPM
ATRIAL RATE: 92 BPM
ATRIAL RATE: 94 BPM
BUN SERPL-MCNC: 8 MG/DL (ref 5–25)
CALCIUM SERPL-MCNC: 8.4 MG/DL (ref 8.3–10.1)
CHLORIDE SERPL-SCNC: 108 MMOL/L (ref 100–108)
CO2 SERPL-SCNC: 26 MMOL/L (ref 21–32)
CREAT SERPL-MCNC: 0.72 MG/DL (ref 0.6–1.3)
ERYTHROCYTE [DISTWIDTH] IN BLOOD BY AUTOMATED COUNT: 13.5 % (ref 11.6–15.1)
EST. AVERAGE GLUCOSE BLD GHB EST-MCNC: 114 MG/DL
GFR SERPL CREATININE-BSD FRML MDRD: 104 ML/MIN/1.73SQ M
GLUCOSE P FAST SERPL-MCNC: 95 MG/DL (ref 65–99)
GLUCOSE SERPL-MCNC: 95 MG/DL (ref 65–140)
HBA1C MFR BLD: 5.6 %
HCT VFR BLD AUTO: 45 % (ref 34.8–46.1)
HGB BLD-MCNC: 14.5 G/DL (ref 11.5–15.4)
MAGNESIUM SERPL-MCNC: 2 MG/DL (ref 1.6–2.6)
MCH RBC QN AUTO: 29.5 PG (ref 26.8–34.3)
MCHC RBC AUTO-ENTMCNC: 32.2 G/DL (ref 31.4–37.4)
MCV RBC AUTO: 92 FL (ref 82–98)
P AXIS: 37 DEGREES
P AXIS: 44 DEGREES
P AXIS: 51 DEGREES
PHOSPHATE SERPL-MCNC: 2.6 MG/DL (ref 2.7–4.5)
PLATELET # BLD AUTO: 271 THOUSANDS/UL (ref 149–390)
PMV BLD AUTO: 12.4 FL (ref 8.9–12.7)
POTASSIUM SERPL-SCNC: 3.7 MMOL/L (ref 3.5–5.3)
PR INTERVAL: 158 MS
PR INTERVAL: 166 MS
PR INTERVAL: 178 MS
QRS AXIS: 20 DEGREES
QRS AXIS: 30 DEGREES
QRS AXIS: 50 DEGREES
QRSD INTERVAL: 82 MS
QRSD INTERVAL: 86 MS
QRSD INTERVAL: 88 MS
QT INTERVAL: 398 MS
QT INTERVAL: 402 MS
QT INTERVAL: 414 MS
QTC INTERVAL: 497 MS
QTC INTERVAL: 497 MS
QTC INTERVAL: 511 MS
RBC # BLD AUTO: 4.92 MILLION/UL (ref 3.81–5.12)
SODIUM SERPL-SCNC: 137 MMOL/L (ref 136–145)
T WAVE AXIS: 42 DEGREES
T WAVE AXIS: 64 DEGREES
T WAVE AXIS: 68 DEGREES
TROPONIN I SERPL-MCNC: <0.02 NG/ML
VENTRICULAR RATE: 92 BPM
VENTRICULAR RATE: 92 BPM
VENTRICULAR RATE: 94 BPM
WBC # BLD AUTO: 8.26 THOUSAND/UL (ref 4.31–10.16)

## 2021-07-30 PROCEDURE — 84484 ASSAY OF TROPONIN QUANT: CPT | Performed by: GENERAL PRACTICE

## 2021-07-30 PROCEDURE — 93010 ELECTROCARDIOGRAM REPORT: CPT | Performed by: INTERNAL MEDICINE

## 2021-07-30 PROCEDURE — 85027 COMPLETE CBC AUTOMATED: CPT | Performed by: GENERAL PRACTICE

## 2021-07-30 PROCEDURE — 83036 HEMOGLOBIN GLYCOSYLATED A1C: CPT | Performed by: GENERAL PRACTICE

## 2021-07-30 PROCEDURE — 93005 ELECTROCARDIOGRAM TRACING: CPT

## 2021-07-30 PROCEDURE — 99217 PR OBSERVATION CARE DISCHARGE MANAGEMENT: CPT | Performed by: NURSE PRACTITIONER

## 2021-07-30 PROCEDURE — 80048 BASIC METABOLIC PNL TOTAL CA: CPT | Performed by: GENERAL PRACTICE

## 2021-07-30 PROCEDURE — 83735 ASSAY OF MAGNESIUM: CPT | Performed by: GENERAL PRACTICE

## 2021-07-30 PROCEDURE — 84100 ASSAY OF PHOSPHORUS: CPT | Performed by: GENERAL PRACTICE

## 2021-07-30 RX ORDER — ESCITALOPRAM OXALATE 10 MG/1
10 TABLET ORAL DAILY
Refills: 0
Start: 2021-07-30 | End: 2022-08-09 | Stop reason: DRUGHIGH

## 2021-07-30 RX ORDER — OMEPRAZOLE 20 MG/1
20 CAPSULE, DELAYED RELEASE ORAL DAILY
Qty: 30 CAPSULE | Refills: 0 | Status: SHIPPED | OUTPATIENT
Start: 2021-07-30

## 2021-07-30 RX ORDER — POTASSIUM CHLORIDE 20 MEQ/1
20 TABLET, EXTENDED RELEASE ORAL DAILY
Qty: 30 TABLET | Refills: 0 | Status: SHIPPED | OUTPATIENT
Start: 2021-07-30

## 2021-07-30 RX ORDER — POTASSIUM CHLORIDE 20 MEQ/1
20 TABLET, EXTENDED RELEASE ORAL DAILY
Status: DISCONTINUED | OUTPATIENT
Start: 2021-07-30 | End: 2021-07-30 | Stop reason: HOSPADM

## 2021-07-30 RX ADMIN — AMLODIPINE BESYLATE 5 MG: 5 TABLET ORAL at 09:43

## 2021-07-30 RX ADMIN — PROCHLORPERAZINE MALEATE 5 MG: 5 TABLET ORAL at 00:28

## 2021-07-30 RX ADMIN — ESCITALOPRAM OXALATE 10 MG: 10 TABLET ORAL at 09:43

## 2021-07-30 RX ADMIN — LEVOTHYROXINE SODIUM 200 MCG: 100 TABLET ORAL at 05:35

## 2021-07-30 RX ADMIN — VARENICLINE TARTRATE 1 MG: 1 TABLET, FILM COATED ORAL at 09:44

## 2021-07-30 RX ADMIN — ENOXAPARIN SODIUM 40 MG: 40 INJECTION SUBCUTANEOUS at 09:43

## 2021-07-30 RX ADMIN — LISINOPRIL 20 MG: 10 TABLET ORAL at 09:43

## 2021-07-30 NOTE — PLAN OF CARE
Problem: PAIN - ADULT  Goal: Verbalizes/displays adequate comfort level or baseline comfort level  Description: Interventions:  - Encourage patient to monitor pain and request assistance  - Assess pain using appropriate pain scale  - Administer analgesics based on type and severity of pain and evaluate response  - Implement non-pharmacological measures as appropriate and evaluate response  - Consider cultural and social influences on pain and pain management  - Notify physician/advanced practitioner if interventions unsuccessful or patient reports new pain  Outcome: Progressing     Problem: INFECTION - ADULT  Goal: Absence or prevention of progression during hospitalization  Description: INTERVENTIONS:  - Assess and monitor for signs and symptoms of infection  - Monitor lab/diagnostic results  - Monitor all insertion sites, i e  indwelling lines, tubes, and drains  - Monitor endotracheal if appropriate and nasal secretions for changes in amount and color  - [place] appropriate cooling/warming therapies per order  - Administer medications as ordered  - Instruct and encourage patient and family to use good hand hygiene technique  - Identify and instruct in appropriate isolation precautions for identified infection/condition  Outcome: Progressing  Goal: Absence of fever/infection during neutropenic period  Description: INTERVENTIONS:  - Monitor WBC    Outcome: Progressing     Problem: SAFETY ADULT  Goal: Patient will remain free of falls  Description: INTERVENTIONS:  - Educate patient/family on patient safety including physical limitations  - Instruct patient to call for assistance with activity   - Consult OT/PT to assist with strengthening/mobility   - Keep Call bell within reach  - Keep bed low and locked with side rails adjusted as appropriate  - Keep care items and personal belongings within reach  - Initiate and maintain comfort rounds  - Make Fall Risk Sign visible to staff  - Obtain necessary fall risk management equipment  - Apply yellow socks and bracelet for high fall risk patients  - Consider moving patient to room near nurses station  Outcome: Progressing  Goal: Maintain or return to baseline ADL function  Description: INTERVENTIONS:  -  Assess patient's ability to carry out ADLs; assess patient's baseline for ADL function and identify physical deficits which impact ability to perform ADLs (bathing, care of mouth/teeth, toileting, grooming, dressing, etc )  - Assess/evaluate cause of self-care deficits   - Assess range of motion  - Assess patient's mobility; develop plan if impaired  - Assess patient's need for assistive devices and provide as appropriate  - Encourage maximum independence but intervene and supervise when necessary  - Involve family in performance of ADLs  - Assess for home care needs following discharge   - Consider OT consult to assist with ADL evaluation and planning for discharge  - Provide patient education as appropriate  Outcome: Progressing  Goal: Maintains/Returns to pre admission functional level  Description: INTERVENTIONS:  - Perform BMAT or MOVE assessment daily    - Set and communicate daily mobility goal to care team and patient/family/caregiver     - Collaborate with rehabilitation services on mobility goals if consulted  - Out of bed for toileting  - Record patient progress and toleration of activity level   Outcome: Progressing     Problem: DISCHARGE PLANNING  Goal: Discharge to home or other facility with appropriate resources  Description: INTERVENTIONS:  - Identify barriers to discharge w/patient and caregiver  - Arrange for needed discharge resources and transportation as appropriate  - Identify discharge learning needs (meds, wound care, etc )  - Arrange for interpretive services to assist at discharge as needed  - Refer to Case Management Department for coordinating discharge planning if the patient needs post-hospital services based on physician/advanced practitioner order or complex needs related to functional status, cognitive ability, or social support system  Outcome: Progressing     Problem: Knowledge Deficit  Goal: Patient/family/caregiver demonstrates understanding of disease process, treatment plan, medications, and discharge instructions  Description: Complete learning assessment and assess knowledge base    Interventions:  - Provide teaching at level of understanding  - Provide teaching via preferred learning methods  Outcome: Progressing     Problem: CARDIOVASCULAR - ADULT  Goal: Maintains optimal cardiac output and hemodynamic stability  Description: INTERVENTIONS:  - Monitor I/O, vital signs and rhythm  - Monitor for S/S and trends of decreased cardiac output  - Administer and titrate ordered vasoactive medications to optimize hemodynamic stability  - Assess quality of pulses, skin color and temperature  - Assess for signs of decreased coronary artery perfusion  - Instruct patient to report change in severity of symptoms  Outcome: Progressing  Goal: Absence of cardiac dysrhythmias or at baseline rhythm  Description: INTERVENTIONS:  - Continuous cardiac monitoring, vital signs, obtain 12 lead EKG if ordered  - Administer antiarrhythmic and heart rate control medications as ordered  - Monitor electrolytes and administer replacement therapy as ordered  Outcome: Progressing

## 2021-07-30 NOTE — DISCHARGE INSTR - AVS FIRST PAGE
The scheduling office should call you to schedule your stress test within the next 72 hours  If you do not hear anything please call central scheduling at [phone]

## 2021-07-30 NOTE — DISCHARGE SUMMARY
Discharge Summary - [facility] Internal Medicine    Patient Information: Donta Drummond 42 y o  female MRN: [id number]  Unit/Bed#: CW2 213-01 Encounter: [id number]    Discharging Physician / Practitioner: RAJIV Sanchez  PCP: Isai Tanner DO  Admission Date: 7/29/2021  Discharge Date: 07/30/21    Reason for Admission:  Chest pain    Discharge Diagnoses:     Principal Problem:    Chest pain  Active Problems:    Hyperlipidemia    Hypertension    Postoperative hypothyroidism    Hypokalemia    Nausea & vomiting    Prolonged Q-T interval on ECG  Resolved Problems:    * No resolved hospital problems  *      Consultations During Hospital Stay:  · None    Procedures Performed:     · None    Significant Findings / Test Results:     · Chest x-ray negative  · Serial troponins x3 negative  · EKG nonspecific changes, prolonged QTC      Incidental Findings:   · None     Test Results Pending at Discharge (will require follow up): · None     Outpatient Tests Requested:  · Outpatient follow-up with PCP, BMP in 1 week secondary to hypokalemia  · Outpatient stress echo    Complications:  None    Hospital Course:     Donta Drummond is a 42 y o  female patient with past medical history of hypertension, hyperlipidemia, former tobacco use, thyroid cancer status post thyroidectomy who originally presented to the hospital on 7/29/2021 due to chest pain associated with nausea and 2 episodes of vomiting  EKG nonspecific, serial troponins x3 negative  Telemetry review unremarkable  Feeling better overall  Still having intermittent episodes of chest aching associated with laying flat or after eating  Seems more GI in nature, GERD  Will start patient on omeprazole which she already has at home daily  Blood pressure also persistently elevated however better this morning  Will continue home antihypertensives at home dose however recommend follow-up with PCP for blood pressure recheck    Recommended home blood pressure monitor  Additionally, QTC was found to be prolonged and Lexapro was decreased  Would recommend tapering off as outpatient  Potassium also found to be low which is a chronic problem, will discharge on potassium supplementation daily  Recommend BMP in 1 week  Given risk factors, will check outpatient stress echo  Patient in agreement with this  Return criteria given  Stable for discharge home today  Condition at Discharge: stable     Discharge Day Visit / Exam:     Subjective:  Patient denies chest pain at this time  Does continue to have some intermittent chest aching which is associated with eating or lying flat  No shortness of breath or further nausea or vomiting  Agreeable for discharge home today with outpatient stress test     Vitals: Blood Pressure: 140/95 (07/30/21 1030)  Pulse: (!) 111 (07/30/21 0743)  Temperature: 98 9 °F (37 2 °C) (07/30/21 0743)  Temp Source: Oral (07/29/21 2046)  Respirations: 18 (07/30/21 0743)  Height: 5' 1" (154 9 cm) (07/29/21 2046)  Weight - Scale: 79 4 kg (175 lb) (07/29/21 2046)  SpO2: 94 % (07/30/21 0743)  Exam:   Physical Exam  Vitals and nursing note reviewed  Constitutional:       Appearance: She is obese  Cardiovascular:      Rate and Rhythm: Normal rate  Pulmonary:      Breath sounds: Normal breath sounds  Abdominal:      Tenderness: There is no abdominal tenderness  Musculoskeletal:         General: Normal range of motion  Skin:     General: Skin is warm  Neurological:      Mental Status: She is alert and oriented to person, place, and time  Mental status is at baseline  Psychiatric:         Mood and Affect: Affect is flat  Discussion with Family:  Patient    Discharge instructions/Information to patient and family:   See after visit summary for information provided to patient and family  Provisions for Follow-Up Care:  See after visit summary for information related to follow-up care and any pertinent home health orders  Disposition:     Home    For Discharges to [facility]:   · Not Applicable to this Patient - Not Applicable to this Patient    Planned Readmission: no     Discharge Statement:  I spent 40 minutes discharging the patient  This time was spent on the day of discharge  I had direct contact with the patient on the day of discharge  Greater than 50% of the total time was spent examining patient, answering all patient questions, arranging and discussing plan of care with patient as well as directly providing post-discharge instructions  Additional time then spent on discharge activities  Discharge Medications:  See after visit summary for reconciled discharge medications provided to patient and family        ** Please Note: This note has been constructed using a voice recognition system **

## 2021-08-03 ENCOUNTER — ANNUAL EXAM (OUTPATIENT)
Dept: OBGYN CLINIC | Facility: CLINIC | Age: 41
End: 2021-08-03
Payer: COMMERCIAL

## 2021-08-03 VITALS
SYSTOLIC BLOOD PRESSURE: 144 MMHG | BODY MASS INDEX: 33.42 KG/M2 | DIASTOLIC BLOOD PRESSURE: 86 MMHG | HEIGHT: 61 IN | WEIGHT: 177 LBS

## 2021-08-03 DIAGNOSIS — Z12.31 ENCOUNTER FOR SCREENING MAMMOGRAM FOR MALIGNANT NEOPLASM OF BREAST: ICD-10-CM

## 2021-08-03 DIAGNOSIS — B36.9 FUNGAL DERMATITIS: ICD-10-CM

## 2021-08-03 DIAGNOSIS — Z01.419 ENCOUNTER FOR GYNECOLOGICAL EXAMINATION WITHOUT ABNORMAL FINDING: Primary | ICD-10-CM

## 2021-08-03 PROCEDURE — S0612 ANNUAL GYNECOLOGICAL EXAMINA: HCPCS | Performed by: PHYSICIAN ASSISTANT

## 2021-08-03 RX ORDER — HYDROXYZINE PAMOATE 50 MG/1
50 CAPSULE ORAL 3 TIMES DAILY PRN
COMMUNITY
Start: 2021-06-22

## 2021-08-03 RX ORDER — FLUCONAZOLE 150 MG/1
TABLET ORAL
Qty: 2 TABLET | Refills: 0 | Status: SHIPPED | OUTPATIENT
Start: 2021-08-03 | End: 2021-08-09

## 2021-08-03 NOTE — PROGRESS NOTES
Anabela Hanna  1980      CC:  Yearly exam    S:  42 y o  female here for yearly exam      Sexual activity: She is sexually active without pain, bleeding or dryness  She had a kidney infection back in June and was on antibiotics  She did develop some vulvar and vaginal itching  She used an OTC Monistat treatment which helped but not completely  She notes some bumps on her vulva that worried her  Contraception: She uses Mirena (11/7/18) for contraception  She is amenorrheic with this    Last Pap 5/14/18 neg/neg  Last Mammo 9/3/20 neg    We reviewed [facility] guidelines for Pap testing today  Family hx of breast cancer: no  Family hx of ovarian cancer: no  Family hx of colon cancer: no      Current Outpatient Medications:     amLODIPine-benazepril (LOTREL 5-20) 5-20 MG per capsule, Take 1 capsule by mouth daily Prescribed, however, currently not taking due to insurance issue, Disp: 30 capsule, Rfl: 0    atorvastatin (LIPITOR) 80 mg tablet, Take 80 mg by mouth daily, Disp: , Rfl:     escitalopram (LEXAPRO) 10 mg tablet, Take 1 tablet (10 mg total) by mouth daily, Disp: , Rfl: 0    hydrOXYzine pamoate (VISTARIL) 50 mg capsule, Take 50 mg by mouth Three times daily as needed, Disp: , Rfl:     Levothyroxine Sodium 200 MCG CAPS, Take by mouth daily , Disp: , Rfl:     omeprazole (PriLOSEC) 20 mg delayed release capsule, Take 1 capsule (20 mg total) by mouth daily (Patient taking differently: Take 80 mg by mouth daily ), Disp: 30 capsule, Rfl: 0    potassium chloride (K-DUR,KLOR-CON) 20 mEq tablet, Take 1 tablet (20 mEq total) by mouth daily, Disp: 30 tablet, Rfl: 0    rOPINIRole (REQUIP) 4 mg tablet, Take 4 mg by mouth daily at bedtime, Disp: , Rfl:     varenicline (CHANTIX CONTINUING MONTH LINDSEY) 1 mg tablet, TAKE 1 TABLET TWICE DAILY  , Disp: , Rfl:   Social History     Socioeconomic History    Marital status: /Civil Union     Spouse name: Not on file    Number of children: Not on file    Years of education: Not on file    Highest education level: Not on file   Occupational History    Not on file   Tobacco Use    Smoking status: Current Some Day Smoker     Packs/day: 0 25     Years: 15 00     Pack years: 3 75     Types: Cigarettes    Smokeless tobacco: Never Used    Tobacco comment: trying to quit 8/2021   Vaping Use    Vaping Use: Never used   Substance and Sexual Activity    Alcohol use: Yes     Comment: occasional     Drug use: Never    Sexual activity: Yes     Partners: Male     Birth control/protection: I U D  Comment: Mirena 2018   Other Topics Concern    Not on file   Social History Narrative    Not on file     Social Determinants of Health     Financial Resource Strain:     Difficulty of Paying Living Expenses:    Food Insecurity:     Worried About Running Out of Food in the Last Year:     [address] in the Last Year:    Transportation Needs:     Lack of Transportation (Medical):      Lack of Transportation (Non-Medical):    Physical Activity:     Days of Exercise per Week:     Minutes of Exercise per Session:    Stress:     Feeling of Stress :    Social Connections:     Frequency of Communication with Friends and Family:     Frequency of Social Gatherings with Friends and Family:     Attends Sikhism Services:     Active Member of Clubs or Organizations:     Attends Club or Organization Meetings:     Marital Status:    Intimate Partner Violence:     Fear of Current or Ex-Partner:     Emotionally Abused:     Physically Abused:     Sexually Abused:      Family History   Problem Relation Age of Onset    Uterine cancer Mother     Hypothyroidism Father     Lymphoma Father     Hypothyroidism Sister     Hypothyroidism Paternal Grandmother     Hypothyroidism Paternal Aunt       Past Medical History:   Diagnosis Date    Abnormal Pap smear of cervix     [facility] [address] )     thyroid removed    HPV (human papilloma virus) infection     Hyperlipidemia     Hypertension     Hypokalemia 10/25/2017    Postoperative hypothyroidism 10/25/2017    Thyroid [facility] [address] [facility]     Tobacco abuse 10/25/2017        Review of Systems   Respiratory: Negative  Cardiovascular: Negative  Gastrointestinal: Negative for constipation and diarrhea  Genitourinary: Negative for difficulty urinating, pelvic pain, vaginal bleeding, vaginal discharge, itching or odor  O:  Blood pressure 144/86, height 5' 1" (1 549 m), weight 80 3 kg (177 lb)  Patient appears well and is not in distress  Neck is supple without masses  Breasts are symmetrical without mass, tenderness, nipple discharge, skin changes or adenopathy  Abdomen is soft and nontender without masses  External genitals show erythema and excoriation of the labia minora bilaterally  Skin tag in the right and left pantiline  Sebaceous/inclusion cyst of the right labia majora  Urethral meatus and urethra are normal  Bladder is normal to palpation  Vagina is normal with menses present  Cervix is normal without discharge or lesion  Uterus is normal, mobile, nontender without palpable mass  Adnexa are normal, nontender, without palpable mass  A:   Yearly exam     Yeast dermatitis    P:   Pap 2023   Mammo slip provided    Diflucan 150mg po x one dose, repeat in 3 days   Econazole cream QD x 14 days, call in 2 weeks if not all better    RTO one year for yearly exam or sooner as needed

## 2021-08-03 NOTE — PROGRESS NOTES
Here today for her annual Exam  Current complaints none to offer     [id number]  Menarche 5  LMP -Implant  Birth Control  Mirena placed 2018  Last PAP 5/14/2018 Neg/Neg   Last Mammo 9/3/2020    Smoking Status never  Drinking Status occasional   Recreational Drug Use never  Exercise Routine  Daily walking

## 2021-08-04 ENCOUNTER — HOSPITAL ENCOUNTER (OUTPATIENT)
Dept: NON INVASIVE DIAGNOSTICS | Facility: CLINIC | Age: 41
Discharge: HOME | End: 2021-08-04
Payer: COMMERCIAL

## 2021-08-04 DIAGNOSIS — R07.9 CHEST PAIN: ICD-10-CM

## 2021-08-04 PROCEDURE — 93351 STRESS TTE COMPLETE: CPT | Performed by: INTERNAL MEDICINE

## 2021-08-04 PROCEDURE — 93350 STRESS TTE ONLY: CPT

## 2021-08-05 LAB
CHEST PAIN STATEMENT: NORMAL
MAX DIASTOLIC BP: 92 MMHG
MAX HEART RATE: 193 BPM
MAX PREDICTED HEART RATE: 178 BPM
MAX. SYSTOLIC BP: 168 MMHG
PROTOCOL NAME: NORMAL
REASON FOR TERMINATION: NORMAL
TARGET HR FORMULA: NORMAL
TEST INDICATION: NORMAL
TIME IN EXERCISE PHASE: NORMAL

## 2022-08-09 ENCOUNTER — ANNUAL EXAM (OUTPATIENT)
Dept: OBGYN CLINIC | Facility: CLINIC | Age: 42
End: 2022-08-09
Payer: COMMERCIAL

## 2022-08-09 VITALS — HEIGHT: 61 IN | BODY MASS INDEX: 32.28 KG/M2 | WEIGHT: 171 LBS

## 2022-08-09 DIAGNOSIS — Z80.49 FAMILY HISTORY OF UTERINE CANCER: ICD-10-CM

## 2022-08-09 DIAGNOSIS — Z72.0 TOBACCO ABUSE: ICD-10-CM

## 2022-08-09 DIAGNOSIS — R92.2 DENSE BREAST TISSUE ON MAMMOGRAM: ICD-10-CM

## 2022-08-09 DIAGNOSIS — Z12.31 ENCOUNTER FOR SCREENING MAMMOGRAM FOR BREAST CANCER: ICD-10-CM

## 2022-08-09 DIAGNOSIS — Z01.419 ENCOUNTER FOR ANNUAL ROUTINE GYNECOLOGICAL EXAMINATION: Primary | ICD-10-CM

## 2022-08-09 PROBLEM — R92.30 DENSE BREAST TISSUE ON MAMMOGRAM: Status: ACTIVE | Noted: 2022-08-09

## 2022-08-09 PROCEDURE — 99396 PREV VISIT EST AGE 40-64: CPT | Performed by: OBSTETRICS & GYNECOLOGY

## 2022-08-09 PROCEDURE — 0503F POSTPARTUM CARE VISIT: CPT | Performed by: OBSTETRICS & GYNECOLOGY

## 2022-08-09 RX ORDER — ESCITALOPRAM OXALATE 20 MG/1
TABLET ORAL
COMMUNITY
Start: 2022-08-08

## 2022-08-09 RX ORDER — BUPROPION HYDROCHLORIDE 150 MG/1
TABLET ORAL
COMMUNITY
Start: 2022-08-08

## 2022-08-09 NOTE — PROGRESS NOTES
Here today for her anual exam  Current complaints none to offer     [id number]  Menarche 11  LMP amenorrhea with IUD  Birth Control Mirena 11/2018    Last PAP 5/14/2018 -/-   Last Mammo 12/24/21 BiR1    Smoking Status never  Drinking Status social   Recreational Drug Use never  Exercise Routine nothing formal

## 2022-08-09 NOTE — PROGRESS NOTES
Assessment/Plan:  NGE                                                                                                               BCM- Mirena 11/18 - amenorrhea  FH Uterine Ca - M55, rec EMB 45  S/p Thyroidectomy for Ca   Co Testing  q 5 yrs  '23                                                                                  RTO 1 yr  SBA monthly  3 D Mammography - active order, due 12/22  Dense Breasts - ABUS explained  Colonoscopy 44  Exercise 3/wk                  Calcium 1,000 mg/d with Vit D     Depression Screen: Neg       Diagnoses and all orders for this visit:    Encounter for annual routine gynecological examination    Encounter for screening mammogram for breast cancer  -     Mammo screening bilateral w 3d & cad; Future    Dense breast tissue on mammogram  -     US breast screening bilateral complete (ABUS); Future    Family history of uterine cancer    Tobacco abuse    Other orders  -     buPROPion (WELLBUTRIN XL) 150 mg 24 hr tablet  -     escitalopram (LEXAPRO) 20 mg tablet              Subjective:        Patient ID: Anneliese Roman is a 36 y o  female  Mrs Chad White presents for a yearly evaluation  She has no complaints  She enjoys amenorrhea on Mirena  [place] is infrequent  The following portions of the patient's history were reviewed and updated as appropriate: She  has a past medical history of Abnormal Pap smear of cervix, Cancer ([address] ), HPV (human papilloma virus) infection, Hyperlipidemia, Hypertension, Hypokalemia (10/25/2017), Postoperative hypothyroidism (10/25/2017), Thyroid cancer ([address] ), and Tobacco abuse (10/25/2017)    Patient Active Problem List    Diagnosis Date Noted    Encounter for annual routine gynecological examination 08/09/2022    Encounter for screening mammogram for breast cancer 08/09/2022    Family history of uterine cancer 08/09/2022    Dense breast tissue on mammogram 08/09/2022    Nausea & vomiting 07/29/2021    Prolonged Q-T interval on ECG 07/29/2021    Postoperative hypothyroidism 10/25/2017    Hypokalemia 10/25/2017    Chest pain 10/25/2017    Tobacco abuse 10/25/2017    Hyperlipidemia     Hypertension    PMH:  Menarche  G3, P3; SAVD x 3 , Boys  Smoker  HTN  GERD  Hyperlipidemia  Depression  Thyroid Ca - Thyroidectomy '17  Postop hypothyroidism '17  Mirena 11/18 - Amenorrhea  Pyelonephritis 6/21  She  has a past surgical history that includes Colposcopy w/ biopsy / curettage and Total thyroidectomy  Her family history includes Hypothyroidism in her father, paternal aunt, paternal grandmother, and sister; Lymphoma in her father; Uterine cancer in her mother  FH:  F - d non-Hodgkin's lymphoma '14  M - Uterine Ca Dx 53, d 57 '16  She  reports that she has been smoking cigarettes  She has a 3 75 pack-year smoking history  She has never used smokeless tobacco  She reports current alcohol use  She reports that she does not use drugs  SH:   to [employer]   for a [employer]   3 sons  Current Outpatient Medications   Medication Sig Dispense Refill    amLODIPine-benazepril (LOTREL 5-20) 5-20 MG per capsule Take 1 capsule by mouth daily Prescribed, however, currently not taking due to insurance issue 30 capsule 0    atorvastatin (LIPITOR) 80 mg tablet Take 80 mg by mouth daily      escitalopram (LEXAPRO) 20 mg tablet       hydrOXYzine pamoate (VISTARIL) 50 mg capsule Take 50 mg by mouth Three times daily as needed      Levothyroxine Sodium 200 MCG CAPS Take by mouth daily       omeprazole (PriLOSEC) 20 mg delayed release capsule Take 1 capsule (20 mg total) by mouth daily (Patient taking differently: Take 80 mg by mouth daily) 30 capsule 0    potassium chloride (K-DUR,KLOR-CON) 20 mEq tablet Take 1 tablet (20 mEq total) by mouth daily 30 tablet 0    rOPINIRole (REQUIP) 4 mg tablet Take 4 mg by mouth daily at bedtime      buPROPion (WELLBUTRIN XL) 150 mg 24 hr tablet       econazole nitrate 1 % cream Apply topically daily (Patient not taking: Reported on 8/9/2022) 30 g 0     No current facility-administered medications for this visit  Current Outpatient Medications on File Prior to Visit   Medication Sig    amLODIPine-benazepril (LOTREL 5-20) 5-20 MG per capsule Take 1 capsule by mouth daily Prescribed, however, currently not taking due to insurance issue    atorvastatin (LIPITOR) 80 mg tablet Take 80 mg by mouth daily    escitalopram (LEXAPRO) 20 mg tablet     hydrOXYzine pamoate (VISTARIL) 50 mg capsule Take 50 mg by mouth Three times daily as needed    Levothyroxine Sodium 200 MCG CAPS Take by mouth daily     omeprazole (PriLOSEC) 20 mg delayed release capsule Take 1 capsule (20 mg total) by mouth daily (Patient taking differently: Take 80 mg by mouth daily)    potassium chloride (K-DUR,KLOR-CON) 20 mEq tablet Take 1 tablet (20 mEq total) by mouth daily    rOPINIRole (REQUIP) 4 mg tablet Take 4 mg by mouth daily at bedtime    buPROPion (WELLBUTRIN XL) 150 mg 24 hr tablet     econazole nitrate 1 % cream Apply topically daily (Patient not taking: Reported on 8/9/2022)    [DISCONTINUED] escitalopram (LEXAPRO) 10 mg tablet Take 1 tablet (10 mg total) by mouth daily    [DISCONTINUED] varenicline (CHANTIX CONTINUING MONTH PAK) 1 mg tablet TAKE 1 TABLET TWICE DAILY  No current facility-administered medications on file prior to visit  She has No Known Allergies       Review of Systems   Constitutional: Negative for activity change, appetite change, fatigue and unexpected weight change  Eyes: Negative for visual disturbance  Respiratory: Negative for cough, chest tightness, shortness of breath and wheezing  Cardiovascular: Negative for chest pain, palpitations and leg swelling  Breast: Patient denies tenderness, nipple discharge, masses, or erythema     Gastrointestinal: Negative for abdominal distention, abdominal pain, blood in stool, constipation, diarrhea, nausea and vomiting  Endocrine: Negative for cold intolerance and heat intolerance  Genitourinary: Negative for decreased urine volume, difficulty urinating, dyspareunia, dysuria, frequency, hematuria, menstrual problem, pelvic pain, urgency, vaginal bleeding, vaginal discharge and vaginal pain  Infrequent intercourse  Musculoskeletal: Positive for arthralgias (Fingers) and joint swelling (Some of her fingers)  Skin: Negative for rash  Neurological: Negative for weakness, light-headedness, numbness and headaches  Hematological: Does not bruise/bleed easily  Psychiatric/Behavioral: Negative for agitation, behavioral problems and sleep disturbance  The patient is not nervous/anxious  Objective:    Vitals:    08/09/22 1516   Weight: 77 6 kg (171 lb)   Height: 5' 1" (1 549 m)            Physical Exam  Vitals and nursing note reviewed  Exam conducted with a chaperone present  Constitutional:       General: She is not in acute distress  Appearance: Normal appearance  She is well-developed  HENT:      Head: Normocephalic and atraumatic  Eyes:      General: No scleral icterus  Right eye: No discharge  Left eye: No discharge  Extraocular Movements: Extraocular movements intact  Conjunctiva/sclera: Conjunctivae normal    Neck:      Thyroid: No thyromegaly  Trachea: No tracheal deviation  Cardiovascular:      Rate and Rhythm: Normal rate and regular rhythm  Heart sounds: Normal heart sounds  No murmur heard  Pulmonary:      Effort: Pulmonary effort is normal  No respiratory distress  Breath sounds: Normal breath sounds  No wheezing  Chest:   Breasts: Breasts are symmetrical       Right: No inverted nipple, mass, nipple discharge, skin change or tenderness  Left: No inverted nipple, mass, nipple discharge, skin change or tenderness         Abdominal:      General: Bowel sounds are normal  There is no distension  Palpations: Abdomen is soft  There is no mass  Tenderness: There is no abdominal tenderness  There is no guarding or rebound  Genitourinary:     General: Normal vulva  Labia:         Right: No rash, tenderness or lesion  Left: No rash, tenderness or lesion  Vagina: Normal       Cervix: No cervical motion tenderness or discharge  Uterus: Not deviated, not enlarged and not tender  Adnexa:         Right: No mass, tenderness or fullness  Left: No mass, tenderness or fullness  Rectum: No external hemorrhoid  Comments: Urethral meatus within normal limits  Perineum within normal limits  Bladder well supported  IUD string present  Musculoskeletal:         General: No tenderness  Normal range of motion  Cervical back: Normal range of motion and neck supple  Lymphadenopathy:      Cervical: No cervical adenopathy  Skin:     General: Skin is warm and dry  Neurological:      Mental Status: She is alert and oriented to person, place, and time  Psychiatric:         Mood and Affect: Mood normal          Behavior: Behavior normal          Thought Content:  Thought content normal          Judgment: Judgment normal

## 2022-08-14 ENCOUNTER — OFFICE VISIT (OUTPATIENT)
Dept: URGENT CARE | Facility: MEDICAL CENTER | Age: 42
End: 2022-08-14
Payer: COMMERCIAL

## 2022-08-14 VITALS
DIASTOLIC BLOOD PRESSURE: 79 MMHG | HEIGHT: 61 IN | BODY MASS INDEX: 32.28 KG/M2 | OXYGEN SATURATION: 99 % | RESPIRATION RATE: 18 BRPM | WEIGHT: 171 LBS | SYSTOLIC BLOOD PRESSURE: 142 MMHG | HEART RATE: 80 BPM | TEMPERATURE: 98 F

## 2022-08-14 DIAGNOSIS — M25.471 RIGHT ANKLE SWELLING: Primary | ICD-10-CM

## 2022-08-14 PROCEDURE — 99213 OFFICE O/P EST LOW 20 MIN: CPT | Performed by: STUDENT IN AN ORGANIZED HEALTH CARE EDUCATION/TRAINING PROGRAM

## 2022-08-14 RX ORDER — PREDNISONE 10 MG/1
10 TABLET ORAL DAILY
Qty: 21 TABLET | Refills: 0 | Status: SHIPPED | OUTPATIENT
Start: 2022-08-14

## 2022-08-14 NOTE — PROGRESS NOTES
[facility]        NAME: Ankit Corado is a 36 y o  female  : 1980    MRN: [id number]  DATE: 2022  TIME: 3:43 PM    Assessment and Plan   Right ankle swelling [M25 471]  1  Right ankle swelling  predniSONE 10 mg tablet         Patient Instructions       Follow up with PCP in 3-5 days  Proceed to  ER if symptoms worsen  Chief Complaint     Chief Complaint   Patient presents with    Ankle Pain       Patient states she has right ankle pain and swelling after walking long periods yesterday at [facility]; denies falls or trauma to the ankle         History of Present Illness       HPI  Patient presents today complaining of right-sided ankle pain and swelling after walking long periods of time yesterday  Patient was not a success stool  Denies any falls or trauma to the ankle  Patient denies any weakness numbness tingling loss of sensation    Review of Systems   Review of Systems    Per HPI  Current Medications       Current Outpatient Medications:     predniSONE 10 mg tablet, Take 1 tablet (10 mg total) by mouth daily 6 tab day 1, 5 tab day 2, 4 tab day 3, 3 tab day 4, 2 tab day 5, 1 tab day 6, Disp: 21 tablet, Rfl: 0    amLODIPine-benazepril (LOTREL 5-20) 5-20 MG per capsule, Take 1 capsule by mouth daily Prescribed, however, currently not taking due to insurance issue, Disp: 30 capsule, Rfl: 0    atorvastatin (LIPITOR) 80 mg tablet, Take 80 mg by mouth daily, Disp: , Rfl:     buPROPion (WELLBUTRIN XL) 150 mg 24 hr tablet, , Disp: , Rfl:     econazole nitrate 1 % cream, Apply topically daily (Patient not taking: Reported on 2022), Disp: 30 g, Rfl: 0    escitalopram (LEXAPRO) 20 mg tablet, , Disp: , Rfl:     hydrOXYzine pamoate (VISTARIL) 50 mg capsule, Take 50 mg by mouth Three times daily as needed, Disp: , Rfl:     Levothyroxine Sodium 200 MCG CAPS, Take by mouth daily , Disp: , Rfl:     omeprazole (PriLOSEC) 20 mg delayed release capsule, Take 1 capsule (20 mg total) by mouth daily (Patient taking differently: Take 80 mg by mouth daily), Disp: 30 capsule, Rfl: 0    potassium chloride (K-DUR,KLOR-CON) 20 mEq tablet, Take 1 tablet (20 mEq total) by mouth daily, Disp: 30 tablet, Rfl: 0    rOPINIRole (REQUIP) 4 mg tablet, Take 4 mg by mouth daily at bedtime, Disp: , Rfl:     Current Allergies     Allergies as of 08/14/2022    (No Known Allergies)            The following portions of the patient's history were reviewed and updated as appropriate: allergies, current medications, past family history, past medical history, past social history, past surgical history and problem list      Past Medical History:   Diagnosis Date    Abnormal Pap smear of cervix     Cancer ([facility] )     thyroid removed    HPV (human papilloma virus) infection     Hyperlipidemia     Hypertension     Hypokalemia 10/25/2017    Postoperative hypothyroidism 10/25/2017    Thyroid cancer ([facility] )     Tobacco abuse 10/25/2017       Past Surgical History:   Procedure Laterality Date    COLPOSCOPY W/ BIOPSY / CURETTAGE      TOTAL THYROIDECTOMY         Family History   Problem Relation Age of Onset    Uterine cancer Mother     Hypothyroidism Father     Lymphoma Father     Hypothyroidism Sister     Hypothyroidism Paternal Grandmother     Hypothyroidism Paternal Aunt          Medications have been verified  Objective   /79   Pulse 80   Temp 98 °F (36 7 °C)   Resp 18   Ht 5' 1" (1 549 m)   Wt 77 6 kg (171 lb)   SpO2 99%   BMI 32 31 kg/m²   No LMP recorded  Patient has had an implant  Physical Exam     Physical Exam  Constitutional:       General: She is not in acute distress  Appearance: Normal appearance  HENT:      Head: Normocephalic  Nose: No congestion or rhinorrhea  Mouth/Throat:      Mouth: Mucous membranes are moist       Pharynx: No oropharyngeal exudate or posterior oropharyngeal erythema  Eyes:      General:         Right eye: No discharge           Left eye: No discharge  Conjunctiva/sclera: Conjunctivae normal    Cardiovascular:      Rate and Rhythm: Normal rate and regular rhythm  Pulses: Normal pulses  Pulmonary:      Effort: Pulmonary effort is normal  No respiratory distress  Abdominal:      General: Abdomen is flat  There is no distension  Palpations: Abdomen is soft  Tenderness: There is no abdominal tenderness  Musculoskeletal:      Cervical back: Neck supple  Comments: Full range of motion of the right ankle, mild swelling noted about might be medial malleolus   Skin:     General: Skin is warm  Capillary Refill: Capillary refill takes less than 2 seconds  Neurological:      Mental Status: She is alert and oriented to person, place, and time

## 2023-02-27 ENCOUNTER — APPOINTMENT (OUTPATIENT)
Dept: LAB | Facility: CLINIC | Age: 43
End: 2023-02-27

## 2023-02-27 DIAGNOSIS — M25.471 EFFUSION, RIGHT ANKLE: ICD-10-CM

## 2023-02-27 DIAGNOSIS — M08.1 JUVENILE ANKYLOSING SPONDYLITIS (HCC): ICD-10-CM

## 2023-02-27 LAB — CRP SERPL QL: 6 MG/L

## 2023-02-28 LAB — RHEUMATOID FACT SER QL LA: NEGATIVE

## 2023-03-01 LAB
ANA SER QL IA: NEGATIVE
B BURGDOR IGG+IGM SER-ACNC: <0.2 AI

## 2023-03-08 LAB — HLA-B27 QL NAA+PROBE: NEGATIVE

## 2023-05-04 ENCOUNTER — TELEPHONE (OUTPATIENT)
Dept: UROLOGY | Facility: AMBULATORY SURGERY CENTER | Age: 43
End: 2023-05-04

## 2023-05-04 ENCOUNTER — OFFICE VISIT (OUTPATIENT)
Dept: GASTROENTEROLOGY | Facility: CLINIC | Age: 43
End: 2023-05-04

## 2023-05-04 VITALS
SYSTOLIC BLOOD PRESSURE: 139 MMHG | DIASTOLIC BLOOD PRESSURE: 103 MMHG | WEIGHT: 175 LBS | HEIGHT: 61 IN | HEART RATE: 95 BPM | BODY MASS INDEX: 33.04 KG/M2

## 2023-05-04 DIAGNOSIS — R10.13 EPIGASTRIC PAIN: ICD-10-CM

## 2023-05-04 DIAGNOSIS — R19.7 DIARRHEA, UNSPECIFIED TYPE: Primary | ICD-10-CM

## 2023-05-04 DIAGNOSIS — R14.0 BLOATING: ICD-10-CM

## 2023-05-04 RX ORDER — VARENICLINE TARTRATE 1 MG/1
TABLET, FILM COATED ORAL
COMMUNITY
Start: 2023-02-20

## 2023-05-04 NOTE — PROGRESS NOTES
[facility] - Outpatient Consultation  Katie Garcia 36 y o  female MRN: [id number]  Encounter: [id number]          ASSESSMENT AND PLAN:      1  Diarrhea, unspecified type  65-year-old female referred to us for postprandial abdominal pain, bloating and loose stools throughout the day  She has 3-4 loose bowel movement throughout the day, sometimes stool is fluffy and liquid and sometimes watery diarrhea, she denying any nocturnal diarrhea, she denying any blood in the stool, she denying any nausea or vomiting, she also complained about occasional heartburn and reflux symptoms  She tried to cut down on dairy but no improvement in the symptoms  She cannot pinpoint which foods trigger the symptoms  We will schedule for EGD and colonoscopy to rule out any evidence of celiac sprue, peptic ulcer disease, H  pylori infection or Crohn's disease, her symptoms could be related to IBS  After EGD and colonoscopy we will discuss about further treatment plan  - Colonoscopy; Future    2  Bloating  Postprandial bloating could be related to IBS, advised to avoid fatty food  - EGD; Future    3  Epigastric pain  Scheduled for EGD and colonoscopy together, she will stay on clear liquid diet day before the procedure, she will drink low volume bowel prep and split dosing  - Sod Picosulfate-Mag Ox-Cit Acd 10-3 5-12 MG-GM -GM/175ML SOLN; Take 175 mL by mouth 1 (one) time for 1 dose  Dispense: 175 mL; Refill: 0    ______________________________________________________________________    HPI: 65-year-old female referred to us for postprandial abdominal pain, bloating and diarrhea, symptoms started few months ago and progressively getting worse  She tried to eliminate certain food but so far no success, she cannot pinpoint which foods trigger the symptoms  On [place] stool scale, her stool between 6 to 7 mostly watery and loose    She denying any melena or rectal bleeding, she was diagnosed with papillary thyroid cancer, s/p total thyroidectomy, currently on levothyroxine supplement, she denying any family history of stomach cancer, colon cancer, no family history of pancreatic cancer, strong family history of non-Hodgkin's lymphoma  Family history also positive for celiac sprue  No family history of inflammatory bowel disease, she denying any weight loss      REVIEW OF SYSTEMS:    CONSTITUTIONAL: Denies any fever, chills, rigors, and weight loss  HEENT: No earache or tinnitus  Denies hearing loss or visual disturbances  CARDIOVASCULAR: No chest pain or palpitations  RESPIRATORY: Denies any cough, hemoptysis, shortness of breath or dyspnea on exertion  GASTROINTESTINAL: As noted in the History of Present Illness  GENITOURINARY: No problems with urination  Denies any hematuria or dysuria  NEUROLOGIC: No dizziness or vertigo, denies headaches  MUSCULOSKELETAL: Denies any muscle or joint pain  SKIN: Denies skin rashes or itching  ENDOCRINE: Denies excessive thirst  Denies intolerance to heat or cold  PSYCHOSOCIAL: Denies depression or anxiety  Denies any recent memory loss         Historical Information   Past Medical History:   Diagnosis Date    Abnormal Pap smear of cervix     Cancer ([facility] )     thyroid removed    HPV (human papilloma virus) infection     Hyperlipidemia     Hypertension     Hypokalemia 10/25/2017    Postoperative hypothyroidism 10/25/2017    [facility]     Tobacco abuse 10/25/2017     Past Surgical History:   Procedure Laterality Date    COLPOSCOPY W/ BIOPSY / CURETTAGE      TOTAL THYROIDECTOMY       Social History   Social History     Substance and Sexual Activity   Alcohol Use Yes    Comment: occasional      Social History     Substance and Sexual Activity   Drug Use Never     Social History     Tobacco Use   Smoking Status Former    Packs/day: 0 25    Years: 15 00    Pack years: 3 75    Types: Cigarettes    Quit date:     Years since quittin 3 "  Smokeless Tobacco Never   Tobacco Comments    trying to quit 8/2021     Family History   Problem Relation Age of Onset    Uterine cancer Mother     Hypothyroidism Father     Lymphoma Father     Hypothyroidism Sister     Hypothyroidism Paternal Grandmother     Hypothyroidism Paternal Aunt        Meds/Allergies       Current Outpatient Medications:     amLODIPine-benazepril (LOTREL 5-20) 5-20 MG per capsule    atorvastatin (LIPITOR) 80 mg tablet    escitalopram (LEXAPRO) 20 mg tablet    Levothyroxine Sodium 200 MCG CAPS    rOPINIRole (REQUIP) 4 mg tablet    Sod Picosulfate-Mag Ox-Cit Acd 10-3 5-12 MG-GM -GM/175ML SOLN    varenicline (CHANTIX) 1 mg tablet    buPROPion (WELLBUTRIN XL) 150 mg 24 hr tablet    econazole nitrate 1 % cream    hydrOXYzine pamoate (VISTARIL) 50 mg capsule    omeprazole (PriLOSEC) 20 mg delayed release capsule    potassium chloride (K-DUR,KLOR-CON) 20 mEq tablet    predniSONE 10 mg tablet    No Known Allergies        Objective     Blood pressure (!) 139/103, pulse 95, height 5' 1\" (1 549 m), weight 79 4 kg (175 lb), not currently breastfeeding  Body mass index is 33 07 kg/m²  PHYSICAL EXAM:      General Appearance:   Alert, cooperative, no distress   HEENT:   Normocephalic, atraumatic, anicteric      Neck:  Supple, symmetrical, trachea midline   Lungs:   Clear to auscultation bilaterally; no rales, rhonchi or wheezing; respirations unlabored    Heart[de-identified]   Regular rate and rhythm; no murmur, rub, or gallop  Abdomen:   Soft, non-tender, non-distended; normal bowel sounds; no masses, no organomegaly    Genitalia:   Deferred    Rectal:   Deferred    Extremities:  No cyanosis, clubbing or edema    Pulses:  2+ and symmetric    Skin:  No jaundice, rashes, or lesions    Lymph nodes:  No palpable cervical lymphadenopathy        Lab Results:   No visits with results within 1 Day(s) from this visit     Latest known visit with results is:   Appointment on 02/27/2023   Component " Date Value    Rheumatoid Factor 02/27/2023 Negative     VLAD 02/27/2023 Negative     Lyme Total Antibodies 02/27/2023 <0 2     CRP 02/27/2023 6 0 (H)     HLA B27 02/27/2023 Negative          Radiology Results:   No results found

## 2023-05-04 NOTE — TELEPHONE ENCOUNTER
Patient returning call  Patient meant to schedule with Gastro  Canceled appt on 5/8 with Dr Victor uHgo Estrada      Transferred Patient to GI in [place]

## 2023-05-05 ENCOUNTER — TELEPHONE (OUTPATIENT)
Dept: GASTROENTEROLOGY | Facility: CLINIC | Age: 43
End: 2023-05-05

## 2023-05-05 NOTE — TELEPHONE ENCOUNTER
Scheduled date of EGD/colonoscopy (as of today):5/19/23  Physician performing EGD/colonoscopy:Nelsy  Location of EGD/colonoscopy:[facility]  Desired bowel prep reviewed with patient: Clenpiq  Instructions reviewed with patient by:Alysha EID  Clearances:  None

## 2023-05-18 RX ORDER — SODIUM CHLORIDE, SODIUM LACTATE, POTASSIUM CHLORIDE, CALCIUM CHLORIDE 600; 310; 30; 20 MG/100ML; MG/100ML; MG/100ML; MG/100ML
75 INJECTION, SOLUTION INTRAVENOUS CONTINUOUS
Status: CANCELLED | OUTPATIENT
Start: 2023-05-18

## 2023-05-19 ENCOUNTER — ANESTHESIA EVENT (OUTPATIENT)
Dept: GASTROENTEROLOGY | Facility: AMBULATORY SURGERY CENTER | Age: 43
End: 2023-05-19

## 2023-05-19 ENCOUNTER — ANESTHESIA (OUTPATIENT)
Dept: GASTROENTEROLOGY | Facility: AMBULATORY SURGERY CENTER | Age: 43
End: 2023-05-19

## 2023-05-19 ENCOUNTER — HOSPITAL ENCOUNTER (OUTPATIENT)
Dept: GASTROENTEROLOGY | Facility: AMBULATORY SURGERY CENTER | Age: 43
Discharge: HOME | End: 2023-05-19

## 2023-05-19 VITALS
HEART RATE: 85 BPM | DIASTOLIC BLOOD PRESSURE: 99 MMHG | BODY MASS INDEX: 33.61 KG/M2 | SYSTOLIC BLOOD PRESSURE: 148 MMHG | RESPIRATION RATE: 18 BRPM | WEIGHT: 178 LBS | OXYGEN SATURATION: 99 % | HEIGHT: 61 IN | TEMPERATURE: 97.8 F

## 2023-05-19 DIAGNOSIS — R19.7 DIARRHEA, UNSPECIFIED TYPE: ICD-10-CM

## 2023-05-19 DIAGNOSIS — R14.0 BLOATING: ICD-10-CM

## 2023-05-19 DIAGNOSIS — K21.9 GASTROESOPHAGEAL REFLUX DISEASE WITHOUT ESOPHAGITIS: Primary | ICD-10-CM

## 2023-05-19 PROBLEM — F41.9 ANXIETY: Status: ACTIVE | Noted: 2023-05-19

## 2023-05-19 LAB
EXT PREGNANCY TEST URINE: NEGATIVE
EXT. CONTROL: NORMAL

## 2023-05-19 RX ORDER — AMLODIPINE BESYLATE 5 MG/1
5 TABLET ORAL DAILY
COMMUNITY
Start: 2023-04-25

## 2023-05-19 RX ORDER — SODIUM CHLORIDE, SODIUM LACTATE, POTASSIUM CHLORIDE, CALCIUM CHLORIDE 600; 310; 30; 20 MG/100ML; MG/100ML; MG/100ML; MG/100ML
INJECTION, SOLUTION INTRAVENOUS CONTINUOUS PRN
Status: DISCONTINUED | OUTPATIENT
Start: 2023-05-19 | End: 2023-05-19

## 2023-05-19 RX ORDER — LIDOCAINE HYDROCHLORIDE 20 MG/ML
INJECTION, SOLUTION EPIDURAL; INFILTRATION; INTRACAUDAL; PERINEURAL AS NEEDED
Status: DISCONTINUED | OUTPATIENT
Start: 2023-05-19 | End: 2023-05-19

## 2023-05-19 RX ORDER — LEVOTHYROXINE SODIUM 200 MCG
TABLET ORAL
COMMUNITY
Start: 2023-05-15

## 2023-05-19 RX ORDER — FAMOTIDINE 40 MG/1
40 TABLET, FILM COATED ORAL DAILY
Qty: 30 TABLET | Refills: 2 | Status: SHIPPED | OUTPATIENT
Start: 2023-05-19 | End: 2023-08-17

## 2023-05-19 RX ORDER — BENAZEPRIL HYDROCHLORIDE 20 MG/1
20 TABLET ORAL DAILY
COMMUNITY
Start: 2023-04-25

## 2023-05-19 RX ORDER — DIPHENOXYLATE HYDROCHLORIDE AND ATROPINE SULFATE 2.5; .025 MG/1; MG/1
1 TABLET ORAL
Qty: 30 TABLET | Refills: 0 | Status: SHIPPED | OUTPATIENT
Start: 2023-05-19 | End: 2023-06-03

## 2023-05-19 RX ORDER — SODIUM CHLORIDE, SODIUM LACTATE, POTASSIUM CHLORIDE, CALCIUM CHLORIDE 600; 310; 30; 20 MG/100ML; MG/100ML; MG/100ML; MG/100ML
75 INJECTION, SOLUTION INTRAVENOUS CONTINUOUS
Status: DISCONTINUED | OUTPATIENT
Start: 2023-05-19 | End: 2023-05-23 | Stop reason: HOSPADM

## 2023-05-19 RX ORDER — PROPOFOL 10 MG/ML
INJECTION, EMULSION INTRAVENOUS AS NEEDED
Status: DISCONTINUED | OUTPATIENT
Start: 2023-05-19 | End: 2023-05-19

## 2023-05-19 RX ADMIN — PROPOFOL 50 MG: 10 INJECTION, EMULSION INTRAVENOUS at 12:42

## 2023-05-19 RX ADMIN — PROPOFOL 100 MG: 10 INJECTION, EMULSION INTRAVENOUS at 12:45

## 2023-05-19 RX ADMIN — LIDOCAINE HYDROCHLORIDE 100 MG: 20 INJECTION, SOLUTION EPIDURAL; INFILTRATION; INTRACAUDAL; PERINEURAL at 12:33

## 2023-05-19 RX ADMIN — SODIUM CHLORIDE, SODIUM LACTATE, POTASSIUM CHLORIDE, CALCIUM CHLORIDE: 600; 310; 30; 20 INJECTION, SOLUTION INTRAVENOUS at 12:30

## 2023-05-19 RX ADMIN — PROPOFOL 50 MG: 10 INJECTION, EMULSION INTRAVENOUS at 12:39

## 2023-05-19 RX ADMIN — PROPOFOL 100 MG: 10 INJECTION, EMULSION INTRAVENOUS at 12:33

## 2023-05-19 RX ADMIN — PROPOFOL 50 MG: 10 INJECTION, EMULSION INTRAVENOUS at 12:35

## 2023-05-19 RX ADMIN — PROPOFOL 50 MG: 10 INJECTION, EMULSION INTRAVENOUS at 12:50

## 2023-05-19 NOTE — ANESTHESIA PREPROCEDURE EVALUATION
Procedure:  COLONOSCOPY  EGD    Relevant Problems   CARDIO   (+) Hyperlipidemia   (+) Hypertension      ENDO   (+) Postoperative hypothyroidism      NEURO/PSYCH   (+) Anxiety        Physical Exam    Airway    Mallampati score: II  TM Distance: >3 FB  Neck ROM: full     Dental       Cardiovascular  Rhythm: regular, Rate: normal,     Pulmonary  Breath sounds clear to auscultation,     Other Findings        Anesthesia Plan  ASA Score- 2     Anesthesia Type- IV sedation with anesthesia with ASA Monitors  Additional Monitors:   Airway Plan:           Plan Factors-    Chart reviewed  Patient is not a current smoker  Induction- intravenous  Postoperative Plan-     Informed Consent- Anesthetic plan and risks discussed with patient  I personally reviewed this patient with the CRNA  Discussed and agreed on the Anesthesia Plan with the CRNA  Leo Tejada

## 2023-05-19 NOTE — H&P
"History and Physical -  [facility]  Emory Bonilla 36 y o  female MRN: [id number]                  HPI: Emory Bonilla is a 36y o  year old female who presents for diarrhea, bloating and abdominal pain      REVIEW OF SYSTEMS: Per the HPI, and otherwise unremarkable      Historical Information   Past Medical History:   Diagnosis Date   • Abnormal Pap smear of cervix    • Anxiety    • Cancer ([facility] )     thyroid removed   • Disease of thyroid gland     had thyroidectomy due to CA   • HPV (human papilloma virus) infection    • Hyperlipidemia    • Hypertension    • Hypokalemia 10/25/2017   • Postoperative hypothyroidism 10/25/2017   • Thyroid cancer ([facility] )    • Tobacco abuse 10/25/2017     Past Surgical History:   Procedure Laterality Date   • COLPOSCOPY W/ BIOPSY / CURETTAGE     • TOTAL THYROIDECTOMY       Social History   Social History     Substance and Sexual Activity   Alcohol Use Yes    Comment: occasional - rare     Social History     Substance and Sexual Activity   Drug Use Never     Social History     Tobacco Use   Smoking Status Former   • Packs/day: 0 25   • Years: 15 00   • Pack years: 3 75   • Types: Cigarettes   • Quit date:    • Years since quittin 3   Smokeless Tobacco Never   Tobacco Comments    trying to quit 2021     Family History   Problem Relation Age of Onset   • Uterine cancer Mother    • Hypothyroidism Father    • Lymphoma Father    • Hypothyroidism Sister    • Hypothyroidism Paternal Grandmother    • Hypothyroidism Paternal Aunt        Meds/Allergies     (Not in a hospital admission)      No Known Allergies    Objective     /93   Pulse 78   Temp 97 8 °F (36 6 °C) (Skin)   Resp 18   Ht 5' 1\" (1 549 m)   Wt 80 7 kg (178 lb)   LMP  (LMP Unknown)   SpO2 97%   BMI 33 63 kg/m²       PHYSICAL EXAM    Gen: NAD  CV: RRR  CHEST: Clear  ABD: soft, NT/ND  EXT: no edema      ASSESSMENT/PLAN:  This is a 36y o  year old female here for EGD and colonoscopy, and she is " stable and optimized for her procedure

## 2023-05-19 NOTE — ANESTHESIA POSTPROCEDURE EVALUATION
Post-Op Assessment Note    CV Status:  Stable    Pain management: adequate     Mental Status:  Alert and awake   Hydration Status:  Euvolemic   PONV Controlled:  Controlled   Airway Patency:  Patent      Post Op Vitals Reviewed: Yes      Staff: CRNA         No notable events documented      BP   146/100   Temp      Pulse  90   Resp   16   SpO2   95%

## 2023-05-24 PROCEDURE — 88305 TISSUE EXAM BY PATHOLOGIST: CPT | Performed by: PATHOLOGY

## 2023-06-11 DIAGNOSIS — R19.7 DIARRHEA, UNSPECIFIED TYPE: ICD-10-CM

## 2023-06-11 DIAGNOSIS — K21.9 GASTROESOPHAGEAL REFLUX DISEASE WITHOUT ESOPHAGITIS: ICD-10-CM

## 2023-06-11 RX ORDER — FAMOTIDINE 40 MG/1
TABLET, FILM COATED ORAL
Qty: 90 TABLET | Refills: 1 | Status: SHIPPED | OUTPATIENT
Start: 2023-06-11

## 2024-02-21 PROBLEM — Z01.419 ENCOUNTER FOR ANNUAL ROUTINE GYNECOLOGICAL EXAMINATION: Status: RESOLVED | Noted: 2022-08-09 | Resolved: 2024-02-21

## 2024-05-13 ENCOUNTER — TELEPHONE (OUTPATIENT)
Age: 44
End: 2024-05-13

## 2024-05-13 NOTE — TELEPHONE ENCOUNTER
Patients GI provider:  Dr. Gonzalez    Number to return call: [phone]    Reason for call: Pain , nausea     Scheduled procedure/appointment date if applicable: Appt 5/16/24

## 2024-05-16 ENCOUNTER — OFFICE VISIT (OUTPATIENT)
Dept: GASTROENTEROLOGY | Facility: CLINIC | Age: 44
End: 2024-05-16
Payer: COMMERCIAL

## 2024-05-16 VITALS
HEIGHT: 61 IN | BODY MASS INDEX: 32.32 KG/M2 | HEART RATE: 90 BPM | DIASTOLIC BLOOD PRESSURE: 92 MMHG | WEIGHT: 171.2 LBS | SYSTOLIC BLOOD PRESSURE: 137 MMHG

## 2024-05-16 DIAGNOSIS — R19.7 DIARRHEA, UNSPECIFIED TYPE: ICD-10-CM

## 2024-05-16 DIAGNOSIS — R11.0 NAUSEA: Primary | ICD-10-CM

## 2024-05-16 PROCEDURE — 99214 OFFICE O/P EST MOD 30 MIN: CPT | Performed by: INTERNAL MEDICINE

## 2024-05-16 RX ORDER — VENLAFAXINE HYDROCHLORIDE 150 MG/1
150 CAPSULE, EXTENDED RELEASE ORAL DAILY
COMMUNITY

## 2024-05-16 NOTE — PROGRESS NOTES
[facility] - Outpatient Follow-up Note  Lou Woodson 43 y.o. female MRN: [id number]  Encounter: [id number]          ASSESSMENT AND PLAN:      1. Nausea  2. Diarrhea, unspecified type  Etiology not clear.  Symptoms may well be functional but lack of abdominal pain essentially rules out IBS.  Immediate onset of symptoms SIBO or malabsorptive process unlikely.  Will evaluate for other etiologies as below including gastroparesis, GERD, and pancreatic insufficiency more an inflammatory process.    - NM gastric emptying; Future  - US right upper quadrant; Future  - Calprotectin,Fecal; Future  - Calprotectin,Fecal  - Giardia antigen  - Fecal elastase    ______________________________________________________________________    SUBJECTIVE: Former patient of Dr. Whittington with history of chronic diarrhea for several years, now reports onset of postprandial nausea over the past several months.  No vomiting, no unexplained weight loss or blood in stool.  She previously underwent colonoscopy and EGD in 2023 with biopsies of the esophagus, stomach, duodenum and colon which were unremarkable.  Family history of celiac disease though duodenal biopsies were negative.  Reports nausea and diarrhea almost immediately after eating.  Symptoms are intermittent but frequent occurring with about 80% of p.o. intake and nausea tends to last all day.  Has not identified any specific foods that are causative.  Does report early satiety.  No abdominal pain      REVIEW OF SYSTEMS:    Review of Systems   Constitutional: Positive for fever. Negative for weight loss.   Musculoskeletal:  Positive for myalgias.   Gastrointestinal:  Positive for abdominal pain, diarrhea, flatus, hematochezia, melena and nausea. Negative for anorexia, constipation and vomiting.   Genitourinary:  Positive for frequency. Negative for dysuria and hematuria.   Neurological:  Positive for headaches.      Answers submitted by the patient for this  visit:  Abdominal Pain Questionnaire (Submitted on 5/15/2024)  Chief Complaint: Abdominal pain  Progression since onset: resolved  arthralgias: Yes  belching: Yes  Aggravated by: nothing  Relieved by: nothing      Historical Information   Past Medical History:   Diagnosis Date    Abnormal Pap smear of cervix     Anxiety     Cancer (HCC)     thyroid removed    Disease of thyroid gland     had thyroidectomy due to CA    HPV (human papilloma virus) infection     Hyperlipidemia     Hypertension     Hypokalemia 10/25/2017    Postoperative hypothyroidism 10/25/2017    Thyroid cancer (HCC)     Tobacco abuse 10/25/2017     Past Surgical History:   Procedure Laterality Date    COLPOSCOPY W/ BIOPSY / CURETTAGE      TOTAL THYROIDECTOMY       Social History   Social History     Substance and Sexual Activity   Alcohol Use Yes    Comment: occasional - rare     Social History     Substance and Sexual Activity   Drug Use Never     Social History     Tobacco Use   Smoking Status Former    Current packs/day: 0.00    Average packs/day: 0.3 packs/day for 15.0 years (3.8 ttl pk-yrs)    Types: Cigarettes    Start date: 2006    Quit date: 2021    Years since quitting: 3.3   Smokeless Tobacco Never   Tobacco Comments    trying to quit 8/2021     Family History   Problem Relation Age of Onset    Uterine cancer Mother     Hypothyroidism Father     Lymphoma Father     Hypothyroidism Sister     Hypothyroidism Paternal Grandmother     Hypothyroidism Paternal Aunt        Meds/Allergies       Current Outpatient Medications:     amLODIPine (NORVASC) 5 mg tablet    atorvastatin (LIPITOR) 80 mg tablet    benazepril (LOTENSIN) 20 mg tablet    venlafaxine (EFFEXOR-XR) 150 mg 24 hr capsule    buPROPion (WELLBUTRIN XL) 150 mg 24 hr tablet    escitalopram (LEXAPRO) 20 mg tablet    famotidine (PEPCID) 40 MG tablet    potassium chloride (K-DUR,KLOR-CON) 20 mEq tablet    rOPINIRole (REQUIP) 4 mg tablet    Synthroid 200 MCG tablet    No Known  "Allergies        Objective     Blood pressure 137/92, pulse 90, height 5' 1\" (1.549 m), weight 77.7 kg (171 lb 3.2 oz), not currently breastfeeding. Body mass index is 32.35 kg/m².      PHYSICAL EXAM:      Physical Exam  Vitals and nursing note reviewed.   Constitutional:       General: She is not in acute distress.     Appearance: She is not ill-appearing.   HENT:      Head: Normocephalic and atraumatic.   Eyes:      General: No scleral icterus.     Extraocular Movements: Extraocular movements intact.   Cardiovascular:      Rate and Rhythm: Normal rate and regular rhythm.   Pulmonary:      Effort: Pulmonary effort is normal. No respiratory distress.   Abdominal:      General: There is no distension.      Palpations: Abdomen is soft.      Tenderness: There is no abdominal tenderness. There is no guarding or rebound.   Skin:     General: Skin is warm and dry.      Coloration: Skin is not cyanotic.      Findings: No erythema.   Neurological:      General: No focal deficit present.      Mental Status: She is alert and oriented to person, place, and time.   Psychiatric:         Mood and Affect: Mood normal.         Behavior: Behavior normal.          Lab Results:   No visits with results within 1 Day(s) from this visit.   Latest known visit with results is:   Hospital Outpatient Visit on 05/19/2023   Component Date Value    EXT Preg Test, Ur 05/19/2023 Negative     Control 05/19/2023 Valid     Case Report 05/19/2023                      Value:Surgical Pathology Report                         Case: [id number]                                   Authorizing Provider:  Jeferson Whittington MD Collected:           05/19/2023 1243              Ordering Location:     [facility] Received:            05/19/2023 2398                                     [place]                                                                  Pathologist:           Jeremy Rawls MD                                            "              Specimens:   A) - Duodenum, cold bx duodenum eval for celiac                                                     B) - Stomach, cold bx antrum erythema ck for hpy                                                    C) - Esophagus, cold bx lower esophagus reflux                                                      D) - Esophagus, cold bx upper esophagus eval for EOE                                                E) - Small Bowel, NOS, cold bx terminal ileum random ck for crohns                                                            F) - Large Intestine, Right/Ascending Colon, cold bx right colon random ck for                      colitis                                                                                             G) - Colon, cold bx left colon random ck for colitis                                       Final Diagnosis 05/19/2023                      Value:This result contains rich text formatting which cannot be displayed here.    Additional Information 05/19/2023                      Value:This result contains rich text formatting which cannot be displayed here.    Gross Description 05/19/2023                      Value:This result contains rich text formatting which cannot be displayed here.         Radiology Results:   No results found.

## 2024-05-24 ENCOUNTER — NURSE TRIAGE (OUTPATIENT)
Age: 44
End: 2024-05-24

## 2024-06-04 DIAGNOSIS — R16.0 HEPATOMEGALY: Primary | ICD-10-CM

## 2024-06-05 ENCOUNTER — TELEPHONE (OUTPATIENT)
Age: 44
End: 2024-06-05

## 2024-06-05 NOTE — TELEPHONE ENCOUNTER
Patient called and asked to fax over script to [facility]. I faxed over electronically thank you

## 2024-06-12 DIAGNOSIS — K76.0 HEPATIC STEATOSIS: Primary | ICD-10-CM

## 2024-06-20 ENCOUNTER — TELEPHONE (OUTPATIENT)
Dept: GASTROENTEROLOGY | Facility: CLINIC | Age: 44
End: 2024-06-20

## 2024-06-20 NOTE — TELEPHONE ENCOUNTER
Patient states she had labs dont at [facility] and will have them sent over.

## 2024-06-25 ENCOUNTER — HOSPITAL ENCOUNTER (OUTPATIENT)
Dept: NUCLEAR MEDICINE | Facility: HOSPITAL | Age: 44
Discharge: HOME | End: 2024-06-25
Payer: COMMERCIAL

## 2024-06-25 DIAGNOSIS — R11.0 NAUSEA: ICD-10-CM

## 2024-06-25 PROCEDURE — 78264 GASTRIC EMPTYING IMG STUDY: CPT

## 2024-06-25 PROCEDURE — A9541 TC99M SULFUR COLLOID: HCPCS

## 2024-06-28 ENCOUNTER — OFFICE VISIT (OUTPATIENT)
Dept: GASTROENTEROLOGY | Facility: CLINIC | Age: 44
End: 2024-06-28
Payer: COMMERCIAL

## 2024-06-28 VITALS
SYSTOLIC BLOOD PRESSURE: 157 MMHG | HEART RATE: 97 BPM | WEIGHT: 171.2 LBS | HEIGHT: 61 IN | DIASTOLIC BLOOD PRESSURE: 107 MMHG | BODY MASS INDEX: 32.32 KG/M2

## 2024-06-28 DIAGNOSIS — R16.0 HEPATOMEGALY: Primary | ICD-10-CM

## 2024-06-28 DIAGNOSIS — R11.0 NAUSEA: ICD-10-CM

## 2024-06-28 DIAGNOSIS — K76.0 HEPATIC STEATOSIS: ICD-10-CM

## 2024-06-28 DIAGNOSIS — R19.7 DIARRHEA, UNSPECIFIED TYPE: ICD-10-CM

## 2024-06-28 DIAGNOSIS — I10 HYPERTENSION, UNSPECIFIED TYPE: ICD-10-CM

## 2024-06-28 PROBLEM — K58.0 IRRITABLE BOWEL SYNDROME WITH DIARRHEA: Status: ACTIVE | Noted: 2024-06-28

## 2024-06-28 PROCEDURE — 99214 OFFICE O/P EST MOD 30 MIN: CPT | Performed by: NURSE PRACTITIONER

## 2024-06-28 RX ORDER — DICYCLOMINE HCL 20 MG
20 TABLET ORAL 2 TIMES DAILY WITH MEALS
Qty: 60 TABLET | Refills: 5 | Status: SHIPPED | OUTPATIENT
Start: 2024-06-28 | End: 2024-07-02

## 2024-06-28 NOTE — PROGRESS NOTES
[facility] - Outpatient Follow-up Note  Lou Woodson 44 y.o. female MRN: [id number]  Encounter: [id number]          ASSESSMENT AND PLAN:      1. Hepatomegaly  2. Hepatic steatosis  Patient had ultrasound of abdomen done which showed hepatomegaly and hepatic steatosis.  Patient then had elastography done which showed S3 greater than 67% steatosis and F1 fibrosis.  Patient does drink alcohol on rare occasions but denies any heavy alcohol use.  Patient is aware that with hepatic steatosis there is a risk of progression to cirrhosis.  Will need repeat elastography and ultrasound of abdomen in 6 months.  Will do serology workup to rule underlying liver disease.  -Advised healthy eating and weight loss to lose fat from liver.  -Advise avoidance of alcohol.  - US abdomen limited; Future  - US elastography; Future  - VLAD Screen w/ Reflex to Titer/Pattern; Future  - Antimitochondrial antibody; Future  - Anti-smooth muscle antibody, IgG; Future  - Chronic Hepatitis Panel; Future  - Iron Panel (Includes Ferritin, Iron Sat%, Iron, and TIBC); Future  - Ceruloplasmin; Future  - Alpha-1-antitrypsin; Future  - VLAD Screen w/ Reflex to Titer/Pattern  - Antimitochondrial antibody  - Anti-smooth muscle antibody, IgG  - Ceruloplasmin  - Alpha-1-antitrypsin    3. Diarrhea unspecified type  4. Nausea  Patient has history of diarrhea and nausea which has been going on for approximately 2 years.  Patient does report eating high-fiber diet.  Patient does not take any fiber supplements.Patient is currently on no medication for the diarrhea.  Patient had colonoscopy done in 2023 biopsy showed no inflammatory bowel disease or microscopic colitis.  Patient's pancreatic elastase was within normal limits,Giardia antigen negative, and fecal calprotectin within normal limits.  Gastric emptying study within normal limits.  Cause of symptoms unclear may be secondary to irritable bowel syndrome or functional.  -  rifaximin (XIFAXAN) 550 mg tablet; Take 1 tablet (550 mg total) by mouth every 8 (eight) hours for 14 days  Dispense: 42 tablet; Refill: 0  - dicyclomine (BENTYL) 20 mg tablet; Take 1 tablet (20 mg total) by mouth 2 (two) times a day with meals  Dispense: 60 tablet; Refill: 5  -High-fiber diet  -Start fiber supplement 3 Gummies daily    5.Hypertension  Blood pressure noted to be elevated in office patient was asymptomatic.  /107.  Recheck blood pressure remained elevated 145/103.  -Patient advised to follow-up with PCP concerning elevated blood pressure.    Follow up 4- 6 months  ______________________________________________________________________    SUBJECTIVE:  Lou Woodson is a old female who presents to office for follow-up for diarrhea, hepatomegaly, and hepatic steatosis. Patient had ultrasound of abdomen done which showed hepatomegaly and hepatic steatosis.  Patient then had elastography done which showed S3 greater than 67% steatosis and F1 fibrosis.  Patient does drink alcohol on rare occasions but denies any heavy alcohol use.  Patient is aware that with hepatic steatosis there is a risk of progression to cirrhosis. Patient has history of diarrhea and nausea which has been going on for approximately 2 years.  Patient does report eating high-fiber diet.  Patient does not take any fiber supplements.Patient is currently on no medication for the diarrhea.  Patient had colonoscopy done in 2023 biopsy showed no inflammatory bowel disease or microscopic colitis.  Patient's pancreatic elastase was within normal limits,Giardia antigen negative, and fecal calprotectin within normal limits.  Gastric emptying study within normal limits.  Patient denies blood in stool, blood from rectal area, or black tarry stool. Patient does report intermittent nausea.  Denies vomiting, acid reflux, heartburn, epigastric or abdominal pain.    Patient had EGD and colonoscopy 5/19/2023.  EGD showed mild gastritis small sliding  hiatal hernia.  Biopsies were benign.  No H. pylori mild and active gastritis.  Colonoscopy showed internal and external hemorrhoids.  Normal colonic mucosa.  Normal terminal ileum.  Multiple biopsies done biopsies negative for inflammatory bowel disease or microscopic colitis.  Patient reports she does drink alcohol socially on rare occasions.  Patient denies any heavy alcohol use.  Patient is a former smoker she quit in 2021.  Patient denies illicit drug use.  Blood pressure noted to be elevated in office patient was asymptomatic.  /107.  Recheck blood pressure remained elevated 145/103.    REVIEW OF SYSTEMS IS OTHERWISE NEGATIVE.      Historical Information   Past Medical History:   Diagnosis Date    Abnormal Pap smear of cervix     Anxiety     Cancer (HCC)     thyroid removed    Disease of thyroid gland     had thyroidectomy due to CA    HPV (human papilloma virus) infection     Hyperlipidemia     Hypertension     Hypokalemia 10/25/2017    Postoperative hypothyroidism 10/25/2017    Thyroid cancer (HCC)     Tobacco abuse 10/25/2017     Past Surgical History:   Procedure Laterality Date    COLPOSCOPY W/ BIOPSY / CURETTAGE      TOTAL THYROIDECTOMY       Social History   Social History     Substance and Sexual Activity   Alcohol Use Yes    Comment: occasional - rare     Social History     Substance and Sexual Activity   Drug Use Never     Social History     Tobacco Use   Smoking Status Former    Current packs/day: 0.00    Average packs/day: 0.3 packs/day for 15.0 years (3.8 ttl pk-yrs)    Types: Cigarettes    Start date: 2006    Quit date: 2021    Years since quitting: 3.4   Smokeless Tobacco Never   Tobacco Comments    trying to quit 8/2021     Family History   Problem Relation Age of Onset    Uterine cancer Mother     Hypothyroidism Father     Lymphoma Father     Hypothyroidism Sister     Hypothyroidism Paternal Grandmother     Hypothyroidism Paternal Aunt        Meds/Allergies       Current Outpatient  "Medications:     amLODIPine (NORVASC) 5 mg tablet    atorvastatin (LIPITOR) 80 mg tablet    benazepril (LOTENSIN) 20 mg tablet    dicyclomine (BENTYL) 20 mg tablet    rifaximin (XIFAXAN) 550 mg tablet    venlafaxine (EFFEXOR-XR) 150 mg 24 hr capsule    buPROPion (WELLBUTRIN XL) 150 mg 24 hr tablet    escitalopram (LEXAPRO) 20 mg tablet    famotidine (PEPCID) 40 MG tablet    potassium chloride (K-DUR,KLOR-CON) 20 mEq tablet    rOPINIRole (REQUIP) 4 mg tablet    Synthroid 200 MCG tablet    No Known Allergies        Objective     Blood pressure (!) 157/107, pulse 97, height 5' 1\" (1.549 m), weight 77.7 kg (171 lb 3.2 oz), not currently breastfeeding. Body mass index is 32.35 kg/m².      PHYSICAL EXAM:      General Appearance:   Alert, cooperative, no distress   HEENT:   Normocephalic, atraumatic, anicteric.     Neck:  Supple, symmetrical, trachea midline   Lungs:   Clear to auscultation bilaterally; no rales, rhonchi or wheezing; respirations unlabored    Heart::   Regular rate and rhythm; no murmur, rub, or gallop.   Abdomen:   Soft, non-tender, non-distended; normal bowel sounds; no masses, no organomegaly    Genitalia:   Deferred    Rectal:   Deferred    Extremities:  No cyanosis, clubbing or edema    Pulses:  2+ and symmetric    Skin:  No jaundice, rashes, or lesions    Lymph nodes:  No palpable cervical lymphadenopathy        Lab Results:   No visits with results within 1 Day(s) from this visit.   Latest known visit with results is:   Hospital Outpatient Visit on 05/19/2023   Component Date Value    EXT Preg Test, Ur 05/19/2023 Negative     Control 05/19/2023 Valid     Case Report 05/19/2023                      Value:Surgical Pathology Report                         Case: [id number]                                   Authorizing Provider:  Jeferson Whittington MD Collected:           05/19/2023 1243              Ordering Location:     [facility] Received:            05/19/2023 2140                "                      [place]                                                                  Pathologist:           Jeremy Rawls MD                                                         Specimens:   A) - Duodenum, cold bx duodenum eval for celiac                                                     B) - Stomach, cold bx antrum erythema ck for hpy                                                    C) - Esophagus, cold bx lower esophagus reflux                                                      D) - Esophagus, cold bx upper esophagus eval for EOE                                                E) - Small Bowel, NOS, cold bx terminal ileum random ck for crohns                                                            F) - Large Intestine, Right/Ascending Colon, cold bx right colon random ck for                      colitis                                                                                             G) - Colon, cold bx left colon random ck for colitis                                       Final Diagnosis 05/19/2023                      Value:A.  Duodenum, biopsy:                             - No significant pathologic abnormalities.                             - No villous atrophy, increased intraepithelial lymphocytes or crypt                           hyperplasia to suggest                               malabsorptive enteropathy.                             - No active inflammation, granulomas, organisms, dysplasia or neoplasia                           identified.                                                    B.  Stomach, biopsy:                             - Chronic inactive antral gastritis.                             - No H. pylori organisms identified on H&E stained sections.                             - No intestinal metaplasia, dysplasia or neoplasia identified.                                                     C.  Lower esophagus, biopsy:                             -  Squamous mucosa with focally up to 7 intramucosal eosinophils per                           HPF.                             - Separate portion of superficial gastric foveolar epithelium without                           significant pathologic abnormalities.                             - No intestinal metaplasia, dysplasia or neoplasia identified.                                                     Comment:  Most studies suggest that >20 eosinophils per HPF in a single                           field or >15 eosinophils per HPF in two fields is diagnostic of                           eosinophilic esophagitis, although the density is usually higher, with a                           mean of 40 eosinophils per HPF.  Reflux esophagitis can produce an                           eosinophilic infiltration, although it is usually limited to the distal                           esophagus and is at a much lower density of <10 eosinophils per HPF.                            Clinical and endoscopic correlation suggested.                                                    D.  Upper esophagus, biopsy:                             - Squamous esophageal mucosa without significant pathologic                           abnormalities.                             - No intraepithelial eosinophils identified.                             - No gastric type mucosa, dysplasia or neoplasia identified.                                                    E.  Terminal ileum, biopsy:                             - Ileal mucosa with normal villous architecture and no significant                           pathologic abnormalities.                             - No active inflammation, parasites, granulomas, dysplasia or neoplasia                           identified.                                                    F.  Right colon, random biopsy:                             - No significant pathologic abnormalities.                             - No  active inflammation or histologic evidence of a microscopic                           (lymphocytic) or collagenous colitis noted.                             - No granulomas, dysplasia or neoplasia identified.                                                    G.  Left colon, random biopsy:                             - No significant pathologic abnormalities.                             - No active inflammation or histologic evidence of a microscopic                           (lymphocytic) or collagenous colitis noted.                             - No granulomas, dysplasia or neoplasia identified.                              Additional Information 05/19/2023                      Value:All reported additional testing was performed with appropriately reactive                           controls.  These tests were developed and their performance                           characteristics determined by [facility] or                           appropriate performing facility, though some tests may be performed on                           tissues which have not been validated for performance characteristics                           (such as staining performed on alcohol exposed cell blocks and decalcified                           tissues).  Results should be interpreted with caution and in the context                           of the patients’ clinical condition. These tests may not be cleared or                           approved by the U.S. Food and Drug Administration, though the FDA has                           determined that such clearance or approval is not necessary. These tests                           are used for clinical purposes and they should not be regarded as                           investigational or for research. This laboratory has been approved by CLIA                           88, designated as a high-complexity laboratory and is qualified to perform                          "  these tests.                          .    Gross Description 05/19/2023                      Value:A. The specimen is received in formalin, labeled with the patient's name                           and hospital number, and is designated \" duodenum\".  It consists of two                           0.2 cm tan-pink tissue fragments.  Entirely submitted in a screen                           cassette.                          B. The specimen is received in formalin, labeled with the patient's name                           and hospital number, and is designated \" stomach\".  It consists of four                           0.2 cm tan-pink tissue fragments.  Entirely submitted in a screen                           cassette.                          C. The specimen is received in formalin, labeled with the patient's name                           and hospital number, and is designated \" lower esophagus\".  It consists of                           two 0.3 cm tan-translucent friable tissue fragments.  Entirely submitted                           in a screen cassette.                          D. The specimen is received in formalin, labeled with the patient's name                           and hospital number, and is designated \" upper esophagus\".  It consists of                           two 0.1 cm tan-white tissue fragments.  Entirely submitted in a screen                           cassette.                          E. The specimen is received in formalin, labeled with the patient's name                           and hospital number, and is designated \" terminal ileum\".  It consists of                           a 0.2 cm tan-pink tissue fragment.  Entirely submitted in a screen                           cassette.                          F. The specimen is received in formalin, labeled with the patient's name                           and hospital number, and is designated \" right colon\".  It consists of two                       " "    0.2 cm tan-white tissue fragments.  Entirely submitted in a screen                           cassette.                          G. The specimen is received in formalin, labeled with the patient's name                           and hospital number, and is designated \" left colon\".  It consists of                           three 0.1 cm tan-pink tissue fragments.  Entirely submitted in a screen                           cassette.                                                    Note: The estimated total formalin fixation time based upon information                           provided by the submitting clinician and the standard processing schedule                           is under 72 hours.  [facility]         Radiology Results:   NM gastric emptying    Result Date: 6/25/2024  Narrative: GASTRIC EMPTYING STUDY INDICATION: R11.0: Nausea postprandial nausea. COMPARISON: CT of the chest 3/19/2014. TECHNIQUE:   The study was performed following the oral administration of 1.0 mCi Tc-99m sulfur colloid combined with scrambled eggs, as part of a standard meal.  Following the meal, one minute anterior and posterior images were obtained immediately and at 0.25 hours, 0.5 hour, 1 hour, 1.5 hour, 2 hour, 3 hour and 4 hour intervals from the time of ingestion.  Geometric mean analyses were then performed. FINDINGS: Gastric emptying at 0.5 hours = 11% (N < 30%) Gastric emptying at 1 hour = 23% (N = 10 - 70%) Gastric emptying at 2 hours = 45% (N = > 40%) Gastric emptying at 3 hours = 65% (N = > 70%) Gastric emptying at 4 hours = 92% (N = >90%) Linear T-1/2 = 133.0 minutes     Impression: Normal rate of gastric emptying at the low end of normal limits. Resident: ASHLEY OLIVEIRA I, the attending radiologist, have reviewed the images and agree with the final report above. Workstation performed: [id number]   "

## 2024-06-28 NOTE — PATIENT INSTRUCTIONS
"Supplement 3 Gummies daily  High-fiber diet  Patient Education     High-fiber diet   The Basics   Written by the doctors and editors at [facility]   What is fiber? -- Fiber is a substance found in some fruits, vegetables, and grains. Most fiber passes through your body without being digested. But it can affect how you digest other foods, and it can also improve your bowel movements.  There are 2 kinds of fiber. One kind is called \"soluble fiber\" and is found in fruits, oats, barley, beans, and peas. The other kind is called \"insoluble fiber,\" and is found in wheat, rye, and other grains.  Both kinds of fiber that you eat are called \"dietary fiber.\"  Why is fiber important to my health? -- Fiber can help make your bowel movements softer and more regular. Adding fiber to your diet can help with problems including constipation, hemorrhoids, and diarrhea. Plus, it can help prevent \"accidents\" if you have trouble controlling your bowel movements.  Getting enough fiber can also help lower your risk of heart disease, stroke, and type 2 diabetes. That's because fiber can help lower cholesterol and help control blood sugar.  How much fiber do I need? -- The recommended amount of fiber is 20 to 35 grams a day. The nutrition label on packaged foods can show you how much fiber you are getting in each serving (figure 1).  How can I make sure I'm getting enough fiber? -- To make sure that you're getting enough fiber, eat plenty of the fruits, vegetables, and grains that contain fiber (table 1 and figure 2). Many breakfast cereals also have a lot of fiber.  If you can't get enough fiber from food, you can add wheat bran to the foods you do eat. Or you can take fiber supplements. These come in the form of powders, wafers, or pills. They include psyllium seed (sample brand names: Metamucil, Konsyl), methylcellulose (sample brand name: Citrucel), polycarbophil (sample brand name: FiberCon), and wheat dextrin (sample brand name: " "Benefiber). If you take a fiber supplement, be sure to read the label so you know how much to take. If you're not sure, ask your doctor or nurse.  What are the side effects of fiber? -- When you start eating more fiber, your belly might feel bloated, or you might have gas or cramps. You can avoid these side effects by adding fiber to your diet slowly.  Some people feel worse when they eat more fiber or take fiber supplements. If you feel worse after adding more fiber to your diet, you can try decreasing the amount of fiber to see if that helps.  All topics are updated as new evidence becomes available and our peer review process is complete.  This topic retrieved from [employer] on: Feb 28, 2024.  Topic 62094 Version 10.0  Release: 32.2.4 - C32.58  © 2024 UpToDate, Inc. and/or its affiliates. All rights reserved.  figure 1: Nutrition label - Fiber     This is an example of a nutrition label. To figure out how much fiber is in a food, look for the line that says \"Dietary Fiber.\" It's also important to look at the serving size. This food has 7 grams of fiber in each serving, and each serving is 1 cup.  Graphic 57026 Version 8.0  table 1: Amount of fiber in different foods  Food  Serving  Grams of fiber    Fruits    Apple (with skin) 1 medium apple 4.4   Banana 1 medium banana 3.1   Oranges 1 orange 3.1   Prunes 1 cup, pitted 12.4   Juices    Apple, unsweetened, with added ascorbic acid 1 cup 0.5   Grapefruit, white, canned, sweetened 1 cup 0.2   Grape, unsweetened, with added ascorbic acid 1 cup 0.5   Orange 1 cup 0.7   Vegetables    Cooked   Green beans 1 cup 4.0   Carrots 1/2 cup sliced 2.3   Peas 1 cup 8.8   Potato (baked, with skin) 1 medium potato 3.8   Raw   Cucumber (with peel) 1 cucumber 1.5   Lettuce 1 cup shredded 0.5   Tomato 1 medium tomato 1.5   Spinach 1 cup 0.7   Legumes   Baked beans, canned, no salt added 1 cup 13.9   Kidney beans, canned 1 cup 13.6   Lima beans, canned 1 cup 11.6   Lentils, boiled 1 " cup 15.6   Breads, pastas, flours    Bran muffins 1 medium muffin 5.2   Oatmeal, cooked 1 cup 4.0   White bread 1 slice 0.6   Whole-wheat bread 1 slice 1.9   Pasta and rice, cooked   Macaroni 1 cup 2.5   Rice, brown 1 cup 3.5   Rice, white 1 cup 0.6   Spaghetti (regular) 1 cup 2.5   Nuts    Almonds 1/2 cup 8.7   Peanuts 1/2 cup 7.9   To learn how much fiber and other nutrients are in different foods, visit the [place] Department of Agriculture ([employer]) FoodData Central website.  Graphic 36229 Version 6.0  figure 2: Foods with fiber     Foods with a lot of fiber include prunes, apples, oranges, bananas, peas, green beans, kidney beans, cooked oatmeal, almonds, peanuts, and whole-wheat bread.  Graphic 21059 Version 1.0  Consumer Information Use and Disclaimer   Disclaimer: This generalized information is a limited summary of diagnosis, treatment, and/or medication information. It is not meant to be comprehensive and should be used as a tool to help the user understand and/or assess potential diagnostic and treatment options. It does NOT include all information about conditions, treatments, medications, side effects, or risks that may apply to a specific patient. It is not intended to be medical advice or a substitute for the medical advice, diagnosis, or treatment of a health care provider based on the health care provider's examination and assessment of a patient's specific and unique circumstances. Patients must speak with a health care provider for complete information about their health, medical questions, and treatment options, including any risks or benefits regarding use of medications. This information does not endorse any treatments or medications as safe, effective, or approved for treating a specific patient. UpToDate, Inc. and its affiliates disclaim any warranty or liability relating to this information or the use thereof.The use of this information is governed by the Terms of Use, available at  https://www.woltersGoodfilmsuwer.com/en/know/clinical-effectiveness-terms. 2024© [employer] and its affiliates and/or licensors. All rights reserved.  Copyright   © 2024 [employer] and/or its affiliates. All rights reserved.     110.7

## 2024-07-01 DIAGNOSIS — R19.7 DIARRHEA, UNSPECIFIED TYPE: ICD-10-CM

## 2024-07-02 RX ORDER — DICYCLOMINE HCL 20 MG
TABLET ORAL
Qty: 200 TABLET | Refills: 1 | Status: SHIPPED | OUTPATIENT
Start: 2024-07-02

## 2024-07-10 ENCOUNTER — NURSE TRIAGE (OUTPATIENT)
Age: 44
End: 2024-07-10

## 2024-07-10 NOTE — TELEPHONE ENCOUNTER
Patient calling for results.  Patient used independent lab, results were not sent to us.  Advised patient to call lab and have sent.

## 2024-07-15 NOTE — TELEPHONE ENCOUNTER
Pt checking on status of results. Advised not yet in EMR. She will have them faxed to [phone].

## 2024-09-23 ENCOUNTER — HOSPITAL ENCOUNTER (EMERGENCY)
Facility: HOSPITAL | Age: 44
Discharge: HOME | End: 2024-09-23
Attending: EMERGENCY MEDICINE
Payer: COMMERCIAL

## 2024-09-23 ENCOUNTER — APPOINTMENT (EMERGENCY)
Dept: RADIOLOGY | Facility: HOSPITAL | Age: 44
End: 2024-09-23
Payer: COMMERCIAL

## 2024-09-23 VITALS
TEMPERATURE: 98.7 F | DIASTOLIC BLOOD PRESSURE: 70 MMHG | HEART RATE: 89 BPM | OXYGEN SATURATION: 98 % | RESPIRATION RATE: 18 BRPM | SYSTOLIC BLOOD PRESSURE: 123 MMHG

## 2024-09-23 DIAGNOSIS — S93.401A SPRAIN OF RIGHT ANKLE, UNSPECIFIED LIGAMENT, INITIAL ENCOUNTER: Primary | ICD-10-CM

## 2024-09-23 PROCEDURE — 99283 EMERGENCY DEPT VISIT LOW MDM: CPT

## 2024-09-23 PROCEDURE — 73630 X-RAY EXAM OF FOOT: CPT

## 2024-09-23 PROCEDURE — 73610 X-RAY EXAM OF ANKLE: CPT

## 2024-09-23 PROCEDURE — 99284 EMERGENCY DEPT VISIT MOD MDM: CPT | Performed by: EMERGENCY MEDICINE

## 2024-09-23 RX ORDER — ACETAMINOPHEN 325 MG/1
975 TABLET ORAL ONCE
Status: COMPLETED | OUTPATIENT
Start: 2024-09-23 | End: 2024-09-23

## 2024-09-23 RX ORDER — IBUPROFEN 600 MG/1
600 TABLET, FILM COATED ORAL ONCE
Status: COMPLETED | OUTPATIENT
Start: 2024-09-23 | End: 2024-09-23

## 2024-09-23 RX ORDER — MORPHINE SULFATE 15 MG/1
15 TABLET ORAL ONCE
Status: COMPLETED | OUTPATIENT
Start: 2024-09-23 | End: 2024-09-23

## 2024-09-23 RX ADMIN — MORPHINE SULFATE 15 MG: 15 TABLET ORAL at 13:01

## 2024-09-23 RX ADMIN — ACETAMINOPHEN 325MG 975 MG: 325 TABLET ORAL at 12:15

## 2024-09-23 RX ADMIN — IBUPROFEN 600 MG: 600 TABLET ORAL at 12:15

## 2024-09-23 NOTE — ED PROVIDER NOTES
1. Sprain of right ankle, unspecified ligament, initial encounter      ED Disposition       ED Disposition   Discharge    Condition   Stable    Date/Time   Mon Sep 23, 2024  1:11 PM    Comment   Lou Woodson discharge to home/self care.                   Assessment & Plan       Medical Decision Making  Lou Woodson is a 44-year-old female with a past medical history of hypothyroidism, HTN, HLD, and depression who presents s/p mechanical fall at 0700 this AM.  No other injury sustained.  X-rays of the right ankle and foot were negative.  Distal pulses intact.  Advised patient wrap ankle, elevate, ice and weight-bear as tolerated.  Patient may take over-the-counter Tylenol and ibuprofen as needed for pain.  Patient was given crutches, and she is to follow-up with PCP and Ortho within a week.  Advised patient to return to the emergency department for worsening of symptoms, development of new symptoms, loss of sensation, foot becomes cold to touch.    Amount and/or Complexity of Data Reviewed  Independent Historian: parent  External Data Reviewed: radiology.  Radiology: ordered.     Details: X-rays right foot and right ankle    Risk  OTC drugs.  Prescription drug management.                     Medications   acetaminophen (TYLENOL) tablet 975 mg (975 mg Oral Given 9/23/24 1215)   ibuprofen (MOTRIN) tablet 600 mg (600 mg Oral Given 9/23/24 1215)   morphine (MSIR) IR tablet 15 mg (15 mg Oral Given 9/23/24 1301)       History of Present Illness         History provided by:  Patient  Ankle Injury  Location:  Right ankle  Severity:  Moderate  Onset quality:  Sudden  Timing:  Constant  Progression:  Unchanged  Chronicity:  New  Context:  Onset 0700 this morning when she rolled her ankle walking down her steps and landed on her right knee.  Denies dizziness prior to the incident, other injuries, loss of consciousness, head strike.  Relieved by:  Resting  Worsened by:  Bearing weight, movement particularly  dorsiflexion  Ineffective treatments:  Icing and Motrin  Associated symptoms: myalgias    Associated symptoms: no abdominal pain, no chest pain, no cough, no diarrhea, no fever, no headaches, no loss of consciousness, no nausea, no shortness of breath, no sore throat and no vomiting        Review of Systems   Constitutional:  Negative for chills and fever.   HENT:  Negative for sore throat.    Respiratory:  Negative for cough and shortness of breath.    Cardiovascular:  Negative for chest pain, palpitations and leg swelling.   Gastrointestinal:  Negative for abdominal pain, constipation, diarrhea, nausea and vomiting.   Genitourinary:  Negative for decreased urine volume, difficulty urinating, frequency and urgency.   Musculoskeletal:  Positive for arthralgias, joint swelling and myalgias. Negative for back pain.   Skin:  Positive for wound (abrasion to R knee).   Neurological:  Negative for dizziness, loss of consciousness, weakness, numbness and headaches.           Objective     ED Triage Vitals [09/23/24 1031]   Temperature Pulse Blood Pressure Respirations SpO2 Patient Position - Orthostatic VS   98.7 °F (37.1 °C) 89 123/70 18 98 % Sitting      Temp Source Heart Rate Source BP Location FiO2 (%) Pain Score    Oral Monitor Right arm -- 4      Physical Exam  Vitals and nursing note reviewed.   Constitutional:       Appearance: Normal appearance.   HENT:      Head: Normocephalic and atraumatic.      Mouth/Throat:      Mouth: Mucous membranes are moist.   Eyes:      Extraocular Movements: Extraocular movements intact.      Conjunctiva/sclera: Conjunctivae normal.      Pupils: Pupils are equal, round, and reactive to light.   Cardiovascular:      Rate and Rhythm: Normal rate and regular rhythm.   Pulmonary:      Effort: Pulmonary effort is normal. No respiratory distress.      Breath sounds: Normal breath sounds.   Abdominal:      General: Bowel sounds are normal. There is no distension.      Palpations: Abdomen is  soft.      Tenderness: There is no abdominal tenderness. There is no guarding.   Musculoskeletal:      Right knee: No swelling, deformity, ecchymosis or bony tenderness. Normal range of motion. No tenderness. Normal pulse.      Right ankle: Swelling (Laterally) present. No deformity, ecchymosis or lacerations. Tenderness present over the lateral malleolus. Decreased range of motion (2/2 pain. Worse with dorsiflexion). Normal pulse.      Left ankle: No swelling, deformity or ecchymosis. No tenderness. Normal range of motion.   Skin:     General: Skin is warm and dry.      Findings: Abrasion (R knee) present.          Neurological:      General: No focal deficit present.      Mental Status: She is alert. Mental status is at baseline.   Psychiatric:         Mood and Affect: Mood normal.         Labs Reviewed - No data to display  XR ankle 3+ views RIGHT   Final Interpretation by Akil Bueno MD (09/23 4137)      No fracture identified. Prominent swelling. Small soft tissue calcification medially.         Computerized Assisted Algorithm (CAA) may have been used to analyze all applicable images.               Workstation performed: [id number]         XR foot 3+ vw right   Final Interpretation by Akil Bueno MD (09/23 7946)      No acute osseous abnormality.         Computerized Assisted Algorithm (CAA) may have been used to analyze all applicable images.         Workstation performed: [id number]             Procedures    ED Medication and Procedure Management   Prior to Admission Medications   Prescriptions Last Dose Informant Patient Reported? Taking?   Synthroid 200 MCG tablet  Self Yes No   Sig: Pt states she takes 150mg   amLODIPine (NORVASC) 5 mg tablet  Self Yes No   Sig: Take 5 mg by mouth daily   atorvastatin (LIPITOR) 80 mg tablet  Self Yes No   Sig: Take 80 mg by mouth daily   benazepril (LOTENSIN) 20 mg tablet  Self Yes No   Sig: Take 20 mg by mouth daily   buPROPion (WELLBUTRIN XL) 150  mg 24 hr tablet  Self Yes No   dicyclomine (BENTYL) 20 mg tablet   No No   Sig: TAKE 1 TABLET BY MOUTH 2 TIMES A DAY WITH MEALS.   escitalopram (LEXAPRO) 20 mg tablet  Self Yes No   famotidine (PEPCID) 40 MG tablet  Self No No   Sig: TAKE 1 TABLET BY MOUTH EVERY DAY   potassium chloride (K-DUR,KLOR-CON) 20 mEq tablet  Self No No   Sig: Take 1 tablet (20 mEq total) by mouth daily   rOPINIRole (REQUIP) 4 mg tablet  Self Yes No   Sig: Take 4 mg by mouth daily at bedtime   venlafaxine (EFFEXOR-XR) 150 mg 24 hr capsule  Self Yes No   Sig: Take 150 mg by mouth daily      Facility-Administered Medications: None     Patient's Medications   Discharge Prescriptions    No medications on file          Yesenia Lopez MD  09/23/24 [id number]       Yesenia Lopez MD  09/23/24 [id number]

## 2024-12-04 DIAGNOSIS — K76.0 HEPATIC STEATOSIS: Primary | ICD-10-CM

## 2025-02-06 ENCOUNTER — RESULTS FOLLOW-UP (OUTPATIENT)
Dept: GASTROENTEROLOGY | Facility: CLINIC | Age: 45
End: 2025-02-06

## 2025-02-06 DIAGNOSIS — K76.0 HEPATIC STEATOSIS: Primary | ICD-10-CM

## 2025-02-06 NOTE — RESULT ENCOUNTER NOTE
Please call the patient regarding results.  Elastography suggest significant fat deposition in the liver but minimal scar tissue development.  This is certainly good news as it should be completely reversible with weight loss.  Recommend continued work toward losing a goal of 15 pounds through diet and exercise.  Will plan to repeat elastography in 1 year for reevaluation
Statement Selected

## 2025-02-07 ENCOUNTER — TELEPHONE (OUTPATIENT)
Age: 45
End: 2025-02-07

## 2025-02-14 NOTE — TELEPHONE ENCOUNTER
Pt transferred to myself by Marilynn.    Elastography results and recommendations reviewed. Pt reports she has recently started on Wegovy for weight loss management. Pt is without further questions at this time.

## 2025-02-14 NOTE — TELEPHONE ENCOUNTER
Pt returning call to obtain results. Patient was transferred over to CITLALLI Gomez for further assistance.

## 2025-03-07 DIAGNOSIS — R19.7 DIARRHEA, UNSPECIFIED TYPE: ICD-10-CM

## 2025-03-07 RX ORDER — DICYCLOMINE HCL 20 MG
20 TABLET ORAL 2 TIMES DAILY WITH MEALS
Qty: 180 TABLET | Refills: 1 | Status: SHIPPED | OUTPATIENT
Start: 2025-03-07

## 2025-05-05 ENCOUNTER — TELEPHONE (OUTPATIENT)
Dept: GASTROENTEROLOGY | Facility: CLINIC | Age: 45
End: 2025-05-05

## 2025-05-05 NOTE — TELEPHONE ENCOUNTER
I spoke to pt who is not having any symptoms at this time and will call back if she needs to schedule. Will delete egd recall. Sb

## 2025-05-05 NOTE — TELEPHONE ENCOUNTER
Dr Gonzalez, this was a former pt of Dr Whittington . You had seen her in the past. Would you still recommend a 2 yr egd recall for this pt? Thanks Parisa

## 2025-05-21 ENCOUNTER — OFFICE VISIT (OUTPATIENT)
Dept: GASTROENTEROLOGY | Facility: CLINIC | Age: 45
End: 2025-05-21

## 2025-05-21 ENCOUNTER — PREP FOR PROCEDURE (OUTPATIENT)
Dept: GASTROENTEROLOGY | Facility: CLINIC | Age: 45
End: 2025-05-21

## 2025-05-21 ENCOUNTER — TELEPHONE (OUTPATIENT)
Age: 45
End: 2025-05-21

## 2025-05-21 VITALS
HEART RATE: 91 BPM | HEIGHT: 61 IN | WEIGHT: 151.4 LBS | DIASTOLIC BLOOD PRESSURE: 72 MMHG | OXYGEN SATURATION: 96 % | SYSTOLIC BLOOD PRESSURE: 98 MMHG | TEMPERATURE: 97.2 F | BODY MASS INDEX: 28.58 KG/M2

## 2025-05-21 DIAGNOSIS — K21.9 GASTROESOPHAGEAL REFLUX DISEASE, UNSPECIFIED WHETHER ESOPHAGITIS PRESENT: Primary | ICD-10-CM

## 2025-05-21 DIAGNOSIS — K76.0 METABOLIC DYSFUNCTION-ASSOCIATED FATTY LIVER DISEASE (MAFLD): ICD-10-CM

## 2025-05-21 DIAGNOSIS — R13.19 ESOPHAGEAL DYSPHAGIA: ICD-10-CM

## 2025-05-21 DIAGNOSIS — K58.0 IRRITABLE BOWEL SYNDROME WITH DIARRHEA: ICD-10-CM

## 2025-05-21 RX ORDER — LEVOTHYROXINE SODIUM 137 UG/1
TABLET ORAL
COMMUNITY
Start: 2025-04-28

## 2025-05-21 RX ORDER — PANTOPRAZOLE SODIUM 40 MG/1
40 TABLET, DELAYED RELEASE ORAL DAILY
Qty: 30 TABLET | Refills: 3 | Status: SHIPPED | OUTPATIENT
Start: 2025-05-21

## 2025-05-21 RX ORDER — BENAZEPRIL HYDROCHLORIDE 40 MG/1
1 TABLET ORAL DAILY
COMMUNITY
Start: 2025-05-05

## 2025-05-21 RX ORDER — LEVOTHYROXINE SODIUM 150 UG/1
150 TABLET ORAL EVERY MORNING
COMMUNITY
Start: 2025-04-09

## 2025-05-21 RX ORDER — SEMAGLUTIDE 1 MG/.5ML
1 INJECTION, SOLUTION SUBCUTANEOUS
COMMUNITY
Start: 2025-05-13

## 2025-05-21 RX ORDER — PROPRANOLOL HCL 20 MG
1 TABLET ORAL 2 TIMES DAILY
COMMUNITY
Start: 2025-05-12

## 2025-05-21 RX ORDER — AMLODIPINE BESYLATE 10 MG/1
1 TABLET ORAL DAILY
COMMUNITY
Start: 2025-05-05

## 2025-05-21 RX ORDER — SPIRONOLACTONE 25 MG/1
1 TABLET ORAL DAILY
COMMUNITY
Start: 2025-05-05

## 2025-05-21 NOTE — TELEPHONE ENCOUNTER
PA for Xifaxan 550 mg      SUBMITTED to [employer]    via    [x]CMM-KEY: [id number]    [x]PA sent as URGENT    All office notes, labs and other pertaining documents and studies sent. Clinical questions answered. Awaiting determination from insurance company.     Turnaround time for your insurance to make a decision on your Prior Authorization can take 7-21 business days.

## 2025-05-21 NOTE — PATIENT INSTRUCTIONS
Scheduled date of EGD(as of today):7/12/25  Physician performing EGD:[facility]  Location of EGD:[place]  Instructions reviewed with patient by:Michael frost  Clearances:  none

## 2025-05-21 NOTE — TELEPHONE ENCOUNTER
PA for Xifaxan 550 mg APPROVED     Date(s) approved 6/4/2025    This medication is a possible high dollar medication. The patient has not been contacted regarding the decision as the office clinical team will handle advising the patient and if/any patient assistance that may have been started.  Thank you.

## 2025-05-21 NOTE — PROGRESS NOTES
Name: Lou Woodson      : 1980      MRN: [id number]  Encounter Provider: Chrissie Gallo PA-C  Encounter Date: 2025   Encounter department: [facility] [place]  :  Assessment & Plan  Gastroesophageal reflux disease, unspecified whether esophagitis present  Chronic and worsening  Had been treated with PPI therapy in the past but it has been quite sometime since she has been on anything  We will start pantoprazole 40 mg once daily EGD in May 2023 noted concentric rings of the esophagus.  Biopsies noted 7 eosinophils per high-power field  Plan to repeat EGD    Recently she is noticing increasing nausea and vomiting which are chronic symptoms for her  She recently reports belching with foul-smelling gas  She is on Wegovy -discussed how this could be the culprit and how would delay stomach emptying  Gastric emptying study prior to starting Wegovy was negative    Esophageal dysphagia  Constant   She always has to drink water when she is eating  EGD in  noted concentric rings in the esophagus -biopsies only noted 7 eosinophils per high-power field  Plan to repeat endoscopy with biopsy         Irritable bowel syndrome with diarrhea  Long history  Slightly worse recently  Given Xifaxan trial last year but unclear if she received this - will try again  Dicyclomine PRN which helps    Colonoscopy in 2023 was normal to the TI with benign biopsies    She has a strong Fhx of Celiac dz - PGM, Pat Aunt x 2, Paternal cousins, and she thinks her father....   She reports having been tested for celiac with negative results - I can only find duodenal biopsies in  that were negative  Will repeat duodenal biopsies  Consider TTG levels     Even if duodenal biopsies are negative I will recommend a GFD as there is a strong possibility of gluten sensitivity      Metabolic dysfunction-associated fatty liver disease (MAFLD)  Ultrasound elastography from January of this year  documented S3 steatosis with F 0-1 fibrosis  She is on Wegovy and has lost 30 pounds  She does not drink alcohol  Liver enzymes are normal  We will continue to monitor  Encouraged ongoing weight loss and muscle building  Repeat ultrasound elastography next year           History of Present Illness   HPI  Lou Woodson is a 44 y.o. female with a long history of diarrhea, nausea, heartburn and dysphagia who presents to [facility].  She was previously seeing Dr. Whittington.  Patient was last seen by gastroenterology in June 2024.  At that time due to her diarrhea she was given a Xifaxan course.  The patient does not remember ever receiving this or taking it.  Her last colonoscopy was in 2023 which was a normal exam to the terminal ileum.  Biopsies were benign and negative for microscopic colitis.  She submitted stool testing including pancreatic elastase, Giardia antigen and fecal calprotectin which were all normal.  She has dicyclomine which she uses once daily.  She thinks it helps the cramping.  She has no rectal bleeding.  She has lost 30 pounds recently but relates this to being on Wegovy.  She is mostly concerned about the possibility of celiac disease.  She has a strong family history for this on her father side.  She had duodenal biopsies taken in 2023 which were negative.  I do not see any blood testing for this.  She has chronic reflux.  She had been on acid reducers in the past.  She is not on anything at present.  She reports that her heartburn is worsening.  She reports frequent nausea and vomiting.  She recently noticed foul-smelling burps.  This was the main reason she made the appointment today.  She has chronic dysphagia.  She reports every time she eats she has to drink liquid to get the food down.  She has no hematemesis or melena.  Her last endoscopy was in May 2023 with noted concentric rings in the esophagus.  Biopsies noted 7 eosinophils per high-power field.      Review of Systems  Medical  History Reviewed by provider this encounter:  Problems     .  Past Medical History   Past Medical History:   Diagnosis Date    Abnormal Pap smear of cervix     Anxiety     Cancer (HCC)     thyroid removed    Disease of thyroid gland     had thyroidectomy due to CA    HPV (human papilloma virus) infection     Hyperlipidemia     Hypertension     Hypokalemia 10/25/2017    Postoperative hypothyroidism 10/25/2017    Thyroid cancer (HCC)     Tobacco abuse 10/25/2017     Past Surgical History:   Procedure Laterality Date    COLPOSCOPY W/ BIOPSY / CURETTAGE      TOTAL THYROIDECTOMY       Family History   Problem Relation Age of Onset    Uterine cancer Mother     Hypothyroidism Father     Lymphoma Father     Hypothyroidism Sister     Hypothyroidism Paternal Grandmother     Hypothyroidism Paternal Aunt       reports that she quit smoking about 4 years ago. Her smoking use included cigarettes. She started smoking about 19 years ago. She has a 3.8 pack-year smoking history. She has never used smokeless tobacco. She reports current alcohol use. She reports that she does not use drugs.  Current Outpatient Medications   Medication Instructions    amLODIPine (NORVASC) 10 mg tablet 1 tablet, Oral, Daily    amLODIPine (NORVASC) 5 mg, Daily    atorvastatin (LIPITOR) 80 mg, Daily    benazepril (LOTENSIN) 40 MG tablet 1 tablet, Oral, Daily    benazepril (LOTENSIN) 20 mg, Daily    dicyclomine (BENTYL) 20 mg, Oral, 2 times daily with meals    levothyroxine 137 mcg tablet TAKE 1 TABLET (137 MCG TOTAL) BY MOUTH EVERY MORNING. DOSE CUT BACK 4/28/2025    levothyroxine 150 mcg, Oral, Every morning    pantoprazole (PROTONIX) 40 mg, Oral, Daily    propranolol (INDERAL) 20 mg tablet 1 tablet, Oral, 2 times daily    rifaximin (XIFAXAN) 550 mg, Oral, Every 8 hours scheduled    spironolactone (ALDACTONE) 25 mg tablet 1 tablet, Oral, Daily    venlafaxine (EFFEXOR-XR) 150 mg, Daily    Wegovy 1 mg, Subcutaneous   Allergies[1]   Medications Ordered  "Prior to Encounter[2]   Social History     Tobacco Use    Smoking status: Former     Current packs/day: 0.00     Average packs/day: 0.3 packs/day for 15.0 years (3.8 ttl pk-yrs)     Types: Cigarettes     Start date:      Quit date:      Years since quittin.3    Smokeless tobacco: Never    Tobacco comments:     trying to quit 2021   Vaping Use    Vaping status: Some Days    Substances: Nicotine   Substance and Sexual Activity    Alcohol use: Yes     Comment: occasional - rare    Drug use: Never    Sexual activity: Yes     Partners: Male     Birth control/protection: I.U.D.     Comment: Mirena         Objective   BP 98/72 (BP Location: Left arm, Patient Position: Sitting, Cuff Size: Standard)   Pulse 91   Temp (!) 97.2 °F (36.2 °C) (Temporal)   Ht 5' 1\" (1.549 m)   Wt 68.7 kg (151 lb 6.4 oz)   SpO2 96%   BMI 28.61 kg/m²      Physical Exam  Vitals reviewed.   Constitutional:       Appearance: Normal appearance.   HENT:      Head: Normocephalic and atraumatic.     Eyes:      Extraocular Movements: Extraocular movements intact.      Pupils: Pupils are equal, round, and reactive to light.       Cardiovascular:      Rate and Rhythm: Normal rate and regular rhythm.   Abdominal:      General: Bowel sounds are normal. There is no distension.      Palpations: Abdomen is soft. There is no mass.      Tenderness: There is no abdominal tenderness.     Skin:     General: Skin is warm and dry.      Coloration: Skin is not jaundiced.     Neurological:      General: No focal deficit present.      Mental Status: She is alert and oriented to person, place, and time.                  [1] No Known Allergies  [2]   Current Outpatient Medications on File Prior to Visit   Medication Sig Dispense Refill    amLODIPine (NORVASC) 10 mg tablet Take 1 tablet by mouth in the morning      atorvastatin (LIPITOR) 80 mg tablet Take 80 mg by mouth in the morning.      benazepril (LOTENSIN) 40 MG tablet Take 1 tablet by mouth in " the morning      dicyclomine (BENTYL) 20 mg tablet TAKE 1 TABLET BY MOUTH TWICE A DAY WITH FOOD 180 tablet 1    levothyroxine 137 mcg tablet TAKE 1 TABLET (137 MCG TOTAL) BY MOUTH EVERY MORNING. DOSE CUT BACK 4/28/2025      levothyroxine 150 mcg tablet Take 150 mcg by mouth every morning      propranolol (INDERAL) 20 mg tablet Take 1 tablet by mouth in the morning and 1 tablet before bedtime.      spironolactone (ALDACTONE) 25 mg tablet Take 1 tablet by mouth in the morning      Wegovy 1 MG/0.5ML Inject 1 mg under the skin      [DISCONTINUED] buPROPion (WELLBUTRIN XL) 150 mg 24 hr tablet       amLODIPine (NORVASC) 5 mg tablet Take 5 mg by mouth daily (Patient not taking: Reported on 5/21/2025)      benazepril (LOTENSIN) 20 mg tablet Take 20 mg by mouth daily (Patient not taking: Reported on 5/21/2025)      venlafaxine (EFFEXOR-XR) 150 mg 24 hr capsule Take 150 mg by mouth daily (Patient not taking: Reported on 5/21/2025)      [DISCONTINUED] escitalopram (LEXAPRO) 20 mg tablet       [DISCONTINUED] famotidine (PEPCID) 40 MG tablet TAKE 1 TABLET BY MOUTH EVERY DAY 90 tablet 1    [DISCONTINUED] potassium chloride (K-DUR,KLOR-CON) 20 mEq tablet Take 1 tablet (20 mEq total) by mouth daily 30 tablet 0    [DISCONTINUED] rOPINIRole (REQUIP) 4 mg tablet Take 4 mg by mouth daily at bedtime      [DISCONTINUED] Synthroid 200 MCG tablet Pt states she takes 150mg       No current facility-administered medications on file prior to visit.

## 2025-05-21 NOTE — ASSESSMENT & PLAN NOTE
Long history  Slightly worse recently  Given Xifaxan trial last year but unclear if she received this - will try again  Dicyclomine PRN which helps    Colonoscopy in 5/2023 was normal to the TI with benign biopsies    She has a strong Fhx of Celiac dz - PGM, Pat Aunt x 2, Paternal cousins, and she thinks her father....   She reports having been tested for celiac with negative results - I can only find duodenal biopsies in 2023 that were negative  Will repeat duodenal biopsies  Consider TTG levels     Even if duodenal biopsies are negative I will recommend a GFD as there is a strong possibility of gluten sensitivity

## 2025-06-13 DIAGNOSIS — K21.9 GASTROESOPHAGEAL REFLUX DISEASE, UNSPECIFIED WHETHER ESOPHAGITIS PRESENT: ICD-10-CM

## 2025-06-13 RX ORDER — PANTOPRAZOLE SODIUM 40 MG/1
40 TABLET, DELAYED RELEASE ORAL DAILY
Qty: 90 TABLET | Refills: 1 | Status: SHIPPED | OUTPATIENT
Start: 2025-06-13

## 2025-07-11 ENCOUNTER — ANESTHESIA (OUTPATIENT)
Dept: GASTROENTEROLOGY | Facility: HOSPITAL | Age: 45
End: 2025-07-11
Payer: COMMERCIAL

## 2025-07-11 ENCOUNTER — ANESTHESIA EVENT (OUTPATIENT)
Dept: GASTROENTEROLOGY | Facility: HOSPITAL | Age: 45
End: 2025-07-11
Payer: COMMERCIAL

## 2025-07-11 ENCOUNTER — HOSPITAL ENCOUNTER (OUTPATIENT)
Dept: GASTROENTEROLOGY | Facility: HOSPITAL | Age: 45
Setting detail: OUTPATIENT SURGERY
End: 2025-07-11
Attending: INTERNAL MEDICINE
Payer: COMMERCIAL

## 2025-07-11 VITALS
HEART RATE: 79 BPM | SYSTOLIC BLOOD PRESSURE: 138 MMHG | OXYGEN SATURATION: 97 % | HEIGHT: 61 IN | RESPIRATION RATE: 18 BRPM | WEIGHT: 145.5 LBS | BODY MASS INDEX: 27.47 KG/M2 | DIASTOLIC BLOOD PRESSURE: 91 MMHG | TEMPERATURE: 98 F

## 2025-07-11 DIAGNOSIS — K21.9 GASTROESOPHAGEAL REFLUX DISEASE, UNSPECIFIED WHETHER ESOPHAGITIS PRESENT: ICD-10-CM

## 2025-07-11 DIAGNOSIS — R13.19 ESOPHAGEAL DYSPHAGIA: ICD-10-CM

## 2025-07-11 LAB
EXT PREGNANCY TEST URINE: NEGATIVE
EXT. CONTROL: NORMAL

## 2025-07-11 PROCEDURE — 43239 EGD BIOPSY SINGLE/MULTIPLE: CPT | Performed by: INTERNAL MEDICINE

## 2025-07-11 PROCEDURE — 81025 URINE PREGNANCY TEST: CPT | Performed by: ANESTHESIOLOGY

## 2025-07-11 PROCEDURE — 88305 TISSUE EXAM BY PATHOLOGIST: CPT | Performed by: PATHOLOGY

## 2025-07-11 RX ORDER — SODIUM CHLORIDE, SODIUM LACTATE, POTASSIUM CHLORIDE, CALCIUM CHLORIDE 600; 310; 30; 20 MG/100ML; MG/100ML; MG/100ML; MG/100ML
INJECTION, SOLUTION INTRAVENOUS CONTINUOUS PRN
Status: DISCONTINUED | OUTPATIENT
Start: 2025-07-11 | End: 2025-07-11

## 2025-07-11 RX ORDER — PROPOFOL 10 MG/ML
INJECTION, EMULSION INTRAVENOUS AS NEEDED
Status: DISCONTINUED | OUTPATIENT
Start: 2025-07-11 | End: 2025-07-11

## 2025-07-11 RX ORDER — LIDOCAINE HYDROCHLORIDE 20 MG/ML
INJECTION, SOLUTION EPIDURAL; INFILTRATION; INTRACAUDAL; PERINEURAL AS NEEDED
Status: DISCONTINUED | OUTPATIENT
Start: 2025-07-11 | End: 2025-07-11

## 2025-07-11 RX ADMIN — PROPOFOL 50 MG: 10 INJECTION, EMULSION INTRAVENOUS at 08:53

## 2025-07-11 RX ADMIN — SODIUM CHLORIDE, SODIUM LACTATE, POTASSIUM CHLORIDE, AND CALCIUM CHLORIDE: .6; .31; .03; .02 INJECTION, SOLUTION INTRAVENOUS at 08:47

## 2025-07-11 RX ADMIN — LIDOCAINE HYDROCHLORIDE 80 MG: 20 INJECTION, SOLUTION EPIDURAL; INFILTRATION; INTRACAUDAL; PERINEURAL at 08:51

## 2025-07-11 RX ADMIN — PROPOFOL 100 MG: 10 INJECTION, EMULSION INTRAVENOUS at 08:59

## 2025-07-11 RX ADMIN — PROPOFOL 50 MG: 10 INJECTION, EMULSION INTRAVENOUS at 08:52

## 2025-07-11 RX ADMIN — PROPOFOL 50 MG: 10 INJECTION, EMULSION INTRAVENOUS at 08:51

## 2025-07-11 NOTE — H&P
"History and Physical -  [facility]  Lou Woodson 45 y.o. female MRN: [id number]                  HPI: Lou Woodson is a 45 y.o. year old female who presents for EGD for dysphagia.      REVIEW OF SYSTEMS: Per the HPI, and otherwise unremarkable.    Historical Information   Past Medical History[1]  Past Surgical History[2]  Social History   Social History     Substance and Sexual Activity   Alcohol Use Yes    Comment: occasional - rare     Social History     Substance and Sexual Activity   Drug Use Never     Tobacco Use History[3]  Family History[4]    Meds/Allergies     Current Medications[5]    Allergies[6]    Objective     /78   Pulse 80   Temp (!) 97 °F (36.1 °C) (Temporal)   Resp 14   Ht 5' 1\" (1.549 m)   Wt 66 kg (145 lb 8.1 oz)   LMP 06/25/2025   SpO2 96%   BMI 27.49 kg/m²       PHYSICAL EXAM    Gen: NAD  Head: NCAT  CV: RRR  CHEST: Clear  ABD: soft, NT/ND  EXT: no edema      ASSESSMENT/PLAN:  Lou Woodson is a 45 y.o. year old female who presents for EGD for dysphagia. The patient is stable and optimized for the procedure, we reviewed risk and benefits. Risk include but not limited to infection, bleeding, perforation and missing a lesion.               [1]   Past Medical History:  Diagnosis Date    Abnormal Pap smear of cervix     Anxiety     Cancer (HCC)     thyroid removed    Disease of thyroid gland     had thyroidectomy due to CA    HPV (human papilloma virus) infection     Hyperlipidemia     Hypertension     Hypokalemia 10/25/2017    Postoperative hypothyroidism 10/25/2017    Thyroid cancer (HCC)     Tobacco abuse 10/25/2017   [2]   Past Surgical History:  Procedure Laterality Date    COLPOSCOPY W/ BIOPSY / CURETTAGE      TOTAL THYROIDECTOMY     [3]   Social History  Tobacco Use   Smoking Status Former    Current packs/day: 0.00    Average packs/day: 0.3 packs/day for 15.0 years (3.8 ttl pk-yrs)    Types: Cigarettes    Start date: 2006    Quit date: 2021    Years " since quittin.5   Smokeless Tobacco Never   Tobacco Comments    trying to quit 2021   [4]   Family History  Problem Relation Name Age of Onset    Uterine cancer Mother      Hypothyroidism Father      Lymphoma Father      Hypothyroidism Sister      Hypothyroidism Paternal Grandmother      Hypothyroidism Paternal Aunt     [5]   Current Outpatient Medications:     amLODIPine (NORVASC) 10 mg tablet    benazepril (LOTENSIN) 40 MG tablet    levothyroxine 137 mcg tablet    levothyroxine 150 mcg tablet    propranolol (INDERAL) 20 mg tablet    venlafaxine (EFFEXOR-XR) 150 mg 24 hr capsule    amLODIPine (NORVASC) 5 mg tablet    atorvastatin (LIPITOR) 80 mg tablet    pantoprazole (PROTONIX) 40 mg tablet    spironolactone (ALDACTONE) 25 mg tablet    Wegovy 1 MG/0.5ML  [6] No Known Allergies

## 2025-07-11 NOTE — ANESTHESIA POSTPROCEDURE EVALUATION
Post-Op Assessment Note    CV Status:  Stable  Pain Score: 0    Pain management: adequate       Mental Status:  Alert and awake   Hydration Status:  Euvolemic   PONV Controlled:  Controlled   Airway Patency:  Patent     Post Op Vitals Reviewed: Yes    No anethesia notable event occurred.    Staff: CRNA           Last Filed PACU Vitals:  Vitals Value Taken Time   Temp 98 °F (36.7 °C) 07/11/25 09:11   Pulse 79 07/11/25 09:31   /91 07/11/25 09:31   Resp 18 07/11/25 09:31   SpO2 97 % 07/11/25 09:31       Modified Gabriela:     Vitals Value Taken Time   Activity 2 07/11/25 09:31   Respiration 2 07/11/25 09:31   Circulation 2 07/11/25 09:31   Consciousness 2 07/11/25 09:31   Oxygen Saturation 2 07/11/25 09:31     Modified Gabriela Score: 10

## 2025-07-11 NOTE — ANESTHESIA PREPROCEDURE EVALUATION
Procedure:  EGD    Relevant Problems   CARDIO   (+) Hyperlipidemia   (+) Hypertension      ENDO   (+) Postoperative hypothyroidism      GI/HEPATIC   (+) Hepatic steatosis   (+) Hepatomegaly      NEURO/PSYCH   (+) Anxiety        ECG CONCLUSIONS:  The stress ECG was negative for ischemia.     BASELINE:  Estimated left ventricular ejection fraction was 60 % .     ECHO CONCLUSIONS:  There was no echocardiographic evidence for stress-induced ischemia.    Physical Exam    Airway     Mallampati score: III  TM Distance: >3 FB  Neck ROM: full  Mouth opening: >= 4 cm      Cardiovascular  Rhythm: regular, Rate: normalNo peripheral edema    Dental   No notable dental hx     Pulmonary   No stridor    Neurological    She appears awake, alert and oriented x3.      Other Findings  post-pubertal.      Anesthesia Plan  ASA Score- 2     Anesthesia Type- MAC with ASA Monitors.         Additional Monitors:     Airway Plan: natural airway.           Plan Factors-Exercise tolerance (METS): >4 METS.    Chart reviewed.   Existing labs reviewed. Patient summary reviewed.                  Induction- intravenous.    Postoperative Plan- .   Monitoring Plan - Monitoring plan - standard ASA monitoring  Post Operative Pain Plan - non-opiod analgesics        Informed Consent- Anesthetic plan and risks discussed with patient.  I personally reviewed this patient with the CRNA. Discussed and agreed on the Anesthesia Plan with the CRNA..      NPO Status:  Vitals Value Taken Time   Date of last liquid 07/10/25 07/11/25 07:45   Time of last liquid 1900 07/11/25 07:45   Date of last solid 07/10/25 07/11/25 07:45   Time of last solid 2000 07/11/25 07:45

## 2025-07-16 PROCEDURE — 88305 TISSUE EXAM BY PATHOLOGIST: CPT | Performed by: PATHOLOGY
